# Patient Record
Sex: FEMALE | Race: WHITE | NOT HISPANIC OR LATINO | Employment: OTHER | URBAN - METROPOLITAN AREA
[De-identification: names, ages, dates, MRNs, and addresses within clinical notes are randomized per-mention and may not be internally consistent; named-entity substitution may affect disease eponyms.]

---

## 2017-09-05 ENCOUNTER — GENERIC CONVERSION - ENCOUNTER (OUTPATIENT)
Dept: OTHER | Facility: OTHER | Age: 82
End: 2017-09-05

## 2017-09-05 DIAGNOSIS — Z00.00 ENCOUNTER FOR GENERAL ADULT MEDICAL EXAMINATION WITHOUT ABNORMAL FINDINGS: ICD-10-CM

## 2017-09-05 DIAGNOSIS — E78.5 HYPERLIPIDEMIA: ICD-10-CM

## 2017-09-05 DIAGNOSIS — K21.9 GASTRO-ESOPHAGEAL REFLUX DISEASE WITHOUT ESOPHAGITIS: ICD-10-CM

## 2017-09-05 DIAGNOSIS — I25.10 ATHEROSCLEROTIC HEART DISEASE OF NATIVE CORONARY ARTERY WITHOUT ANGINA PECTORIS: ICD-10-CM

## 2017-09-05 DIAGNOSIS — E53.8 DEFICIENCY OF OTHER SPECIFIED B GROUP VITAMINS: ICD-10-CM

## 2017-09-05 DIAGNOSIS — M48.061 SPINAL STENOSIS OF LUMBAR REGION: ICD-10-CM

## 2017-09-05 DIAGNOSIS — I10 ESSENTIAL (PRIMARY) HYPERTENSION: ICD-10-CM

## 2017-09-05 DIAGNOSIS — R73.01 IMPAIRED FASTING GLUCOSE: ICD-10-CM

## 2017-10-11 ENCOUNTER — ALLSCRIPTS OFFICE VISIT (OUTPATIENT)
Dept: OTHER | Facility: OTHER | Age: 82
End: 2017-10-11

## 2017-10-13 NOTE — PROGRESS NOTES
Assessment  1  Benign essential hypertension (401 1) (I10)   2  CAD (coronary atherosclerotic disease) (414 00) (I25 10)   3  Esophageal reflux (530 81) (K21 9)   4  Fatigue (780 79) (R53 83)   5  Gait disturbance (781 2) (R26 9)   6  Hyperlipidemia (272 4) (E78 5)   7  Vitamin B12 deficiency (266 2) (E53 8)   8  Weight loss (783 21) (R63 4)   9  Primary degenerative dementia of Alzheimer type (331 0,294 10) (G30 9,F02 80)   10  Urinary incontinence, unspecified type (788 30) (R32)   11  Lumbar canal stenosis (724 02) (M48 061)   12  Depression (311) (F32 9)    Plan  Need for influenza vaccination    · Fluzone High-Dose 0 5 ML Intramuscular Suspension Prefilled Syringe    Discussion/Summary    Long discussion with patient's daughter  will try to limit testing  repeat labs I did suggest stopping generic Exelon to see if her appetite would improve and diarrhea issues resolve  could try nutritional support lactose free supplement  high-dose flu vaccine today  Follow up once results are back  Advised to call if any changes  History of Present Illness  HPI: followup visit  here with her daughter and private aide  last seen 2016 medications reviewed  history of dementia with prior neurology evaluation  currently on generic Exelon 3 mg BID  her   2015  she lives with her daughter  ARCHANA coming in 12 hours a day during the week  Daughter reports an overall decline after a stay in respite care 2017  Decreased appetite frequent diarrhea after meals despite changes with her diet such as eliminating milk and dairy products  More fatigue sleeping frequently during the day  patient is able to feed and toilet herself  transfers unassisted  she needs helps with bathing and dressing  urinary incontinence  no changes  she ambulates short distances with a rollator walker  no recent falls  last labs 2016 see note  hypertension blood pressures stable on current regimen   creatinine 0 71  electrolytes normal  impaired fasting glucose   A1c 6 0  labs 06/2015 A1c 6 2  vitamin D low at 24  vitamin B 12 normal  Folic acid 8 1  Lipid profile cholesterol 150,TGs 155  HDL 52  LDL 77  she is no longer taking statin  Review of Systems    Constitutional: recent ? 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    Cardiovascular: lower extremity edema-and-chronic venous insufficiency  wears compression stockings  history of CAD, but-no chest pain-and-no palpitations  Respiratory: shortness of breath during exertion-and-exertional dyspnea no changes, but-no cough,-no orthopnea,-no wheezing-and-no PND  Gastrointestinal: history of GERD  stable on Protonix  no dysphagia  , but-no abdominal pain,-no nausea,-no constipation-and-no diarrhea  Genitourinary: incontinence-and-UI no changes  she has been on a number of different medications without improvement  she was offered further evaluation in the past but declined  Musculoskeletal: arthralgias-and-chronic lower back pain  no radicular pain  history of lumbar spinal stenosis  she has been prn Tylenol , but-no myalgias  Neurological: chronic disequilibrium  ambulates with walker  CT head 04/2014 no acute changes  moderate microangiopathic changes  , but-no headache  Psychiatric: depression-and-stable on Lexapro 10 mg daily, but-no anxiety  Endocrine: vitamin D deficiency on vitamin D supplement  02/2016 vitamin D level 31 , but-no muscle weakness  Hematologic/Lymphatic: previously documented B 12 deficiency  Active Problems  1  Benign essential hypertension (401 1) (I10)   2  CAD (coronary atherosclerotic disease) (414 00) (I25 10)   3  Depression (311) (F32 9)   4  Esophageal reflux (530 81) (K21 9)   5  Hyperlipidemia (272 4) (E78 5)   6  Insomnia (780 52) (G47 00)   7  Lumbar canal stenosis (724 02) (M48 061)   8  Primary degenerative dementia of Alzheimer type (331 0,294 10) (G30 9,F02 80)   9  Vitamin B12 deficiency (266 2) (E53 8)    Past Medical History  1  History of Accelerated essential hypertension (401 0) (I10)   2  History of rheumatic fever (V12 09) (Z86 79)   3  History of Impaired fasting glucose (790 21) (R73 01)    Family History  Mother    1  Family history of heart attack (V17 3) (Z82 49)  Child    2  Family history of malignant neoplasm (V16 9) (Z80 9)  Family History Reviewed: The family history was reviewed and updated today  Social History   · Completed 12th grade   ·    · Never A Smoker   · No alcohol use   · No caffeine use   · No drug use   · Some college  The social history was reviewed and is unchanged  Surgical History  1  History of Cholecystectomy   2  History of Hysterectomy   3  History of Small Bowel Resection   4  History of Tonsillectomy   5  History of Treatment Of Ankle Fracture    Current Meds   1  Amlodipine Besy-Benazepril HCl - 5-20 MG Oral Capsule; take 1 capsule daily; Therapy: 05AOG4457 to (Evaluate:86Pml3394)  Requested for: 26Jun2017; Last   Rx:26Jun2017 Ordered   2  Aspirin 81 MG TABS; TAKE 1 TABLET DAILY; Therapy: 23OLD0641 to (Evaluate:02Jun2014); Last Rx:73Uzh5649 Ordered   3  Atenolol 50 MG Oral Tablet; Take 1 tablet daily; Therapy: 67AHD8587 to (Evaluate:64Ywp7479)  Requested for: 19SZJ5069; Last   NX:70QIY7081 Ordered   4  Escitalopram Oxalate 10 MG Oral Tablet; take 1 tablet by mouth daily; Therapy: 56Dyf9210 to (Evaluate:08Awn4121)  Requested for: 92DMW2688; Last   CP:01BXS8165 Ordered   5  HydroCHLOROthiazide 12 5 MG Oral Capsule; TAKE 1 CAPSULE DAILY EVERY   MORNING; Therapy: 51XXR6303 to (MSOEQJWE:86VBH7842)  Requested for: 69HYA0438; Last   Rx:08Hli9933 Ordered   6  Isosorbide Mononitrate ER 30 MG Oral Tablet Extended Release 24 Hour; TAKE 1   TABLET DAILY -MAY CAUSE DIZZINESS -MAY CAUSE HEADACHE; Therapy: 60OVK8212 to (Lauren Hayes)  Requested for: 07BMW3777; Last   Rx:32Tva7941 Ordered   7   Pantoprazole Sodium 40 MG Oral Tablet Delayed Release; Take 1 tablet daily; Therapy: 83FGW0362 to (Evaluate:90Sgr6066)  Requested for: 26Jun2017; Last   Rx:26Jun2017 Ordered    Allergies  1  Penicillins   2  Sulfa Drugs    Vitals   Recorded: 34NLI5118 01:40PM   Temperature 98 F   Heart Rate 56   Respiration 18   Systolic 397   Diastolic 70   Height 5 ft 2 in   Weight Unobtainable Yes     Physical Exam    Constitutional   General appearance: No acute distress, well appearing and well nourished  Eyes   Conjunctiva and lids: No swelling, erythema or discharge  -sclera anicteric  Pupils and irises: Equal, round, reactive to light  Ears, Nose, Mouth, and Throat   Otoscopic examination: Tympanic membranes translucent with normal light reflex  Canals patent without erythema  Oropharynx: Normal with no erythema, edema, exudate or lesions  Neck   Neck: Supple, symmetric, trachea midline, no masses  Thyroid: Normal, no thyromegaly  There were no thyroid nodules  Pulmonary   Auscultation of lungs: Clear to auscultation  Cardiovascular   Auscultation of heart: Abnormal   The heart rate was normal  The rhythm was regular  Heart sounds: no gallop heard  A grade 1 systolic murmur was heard at the RUSB  Carotid pulses: 2+ bilaterally  no bruit heard over the right carotid-and-no bruit heard over the left carotid  Examination of extremities for edema and/or varicosities: Abnormal   bilateral ankle trace+ pitting edema  Abdomen   Abdomen: Non-tender, no masses  Liver and spleen: No hepatomegaly or splenomegaly  Lymphatic   Palpation of lymph nodes in neck: No lymphadenopathy  no anterior cervical node enlargement,-no posterior cervical node enlargement,-no submandibular node enlargement-and-no supraclavicular node enlargement  Musculoskeletal gait not tested  Muscle strength/tone: Normal   Motor Tone: no muscle rigidity-and-no cogwheel rigidity of the arms   Tremor(s): no pill rolling tremor on the right hand-and-no pill rolling tremor on the left hand  Skin   Skin and subcutaneous tissue: Normal without rashes or lesions  Neurologic   Cranial nerves: Cranial nerves II-XII intact  Psychiatric   Recent and remote memory: Abnormal   Memory: impaired short term memory-and-impaired long term memory  Mood and affect: Normal        Results/Data  VAS LOWER LIMB VENOUS DUPLEX STUDY, UNILATERAL/LIMITED 50HWV7642 10:39AM Laura Raytahira Order Number: [id number]     Test Name Result Flag Reference   VAS LOWER LIMB VENOUS DUPLEX STUDY, UNILATERAL/LIMITED (Report)     [facility] REPORT   CLINICAL:   Indications: Left Limb Pain [M79 609]  Lt ankle pain and swelling  CONCLUSION:   Impression:   RIGHT LOWER LIMB LIMITED: NORMAL   Evaluation shows no evidence of thrombus in the common femoral vein  Doppler evaluation shows a normal response to augmentation maneuvers  LEFT LOWER LIMB: ABNORMAL   No evidence of acute or chronic deep vein thrombosis   No evidence of superficial thrombophlebitis noted  Doppler evaluation shows a normal response to augmentation maneuvers  Popliteal, posterior tibial and anterior tibial arterial Doppler waveforms are   triphasic  There is a well defined complex non-vascularized structure noted on the medial   to lateral ankle consistent with a hematoma  SIGNATURE:   Electronically Signed by: Boris Scott MD, [address] on 2016-03-17 09:39:32 PM     * XR TIBIA FIBULA 2 VIEW LEFT 40WAN5364 12:38PM Laura Maylin Order Number: [id number]     Test Name Result Flag Reference   XR TIBIA FIBULA 2 VW LEFT (Report)     LEFT TIBIA AND FIBULA     INDICATION: Soft tissue mass  COMPARISON: None     VIEWS: AP and lateral; 4 images     FINDINGS:     There is no acute fracture or dislocation  No degenerative changes  No lytic or blastic lesions are seen       Soft tissue swelling overlying the distal tibia noted with mild lateral malleolar soft tissue swelling also identified  IMPRESSION:     No acute osseous abnormality  Soft tissue swelling  Workstation performed: [id number]     Signed by:   Junior Reed MD   3/14/16     (1) COMPREHENSIVE METABOLIC PANEL 96URH6654 10:34KR [employer]     Test Name Result Flag Reference   GLUCOSE 116 mg/dL H 65-99   Fasting reference interval   UREA NITROGEN (BUN) 18 mg/dL  7-25   CREATININE 0 71 mg/dL  0 60-0 88   For patients >52years of age, the reference limit  for Creatinine is approximately 13% higher for people  identified as -American  eGFR NON-AFR   AMERICAN 76 mL/min/1 73m2  > OR = 60   eGFR AFRICAN AMERICAN 88 mL/min/1 73m2  > OR = 60   BUN/CREATININE RATIO   3-51   NOT APPLICABLE (calc)   SODIUM 138 mmol/L  135-146   POTASSIUM 4 4 mmol/L  3 5-5 3   CHLORIDE 100 mmol/L     CARBON DIOXIDE 27 mmol/L  19-30   CALCIUM 9 6 mg/dL  8 6-10 4   PROTEIN, TOTAL 6 4 g/dL  6 1-8 1   ALBUMIN 4 1 g/dL  3 6-5 1   GLOBULIN 2 3 g/dL (calc)  1 9-3 7   ALBUMIN/GLOBULIN RATIO 1 8 (calc)  1 0-2 5   BILIRUBIN, TOTAL 0 4 mg/dL  0 2-1 2   ALKALINE PHOSPHATASE 75 U/L     AST 16 U/L  10-35   ALT 16 U/L  6-29     (1) CBC/PLT/DIFF [id number] 10:02AM [employer]     Test Name Result Flag Reference   WHITE BLOOD CELL COUNT 4 7 Thousand/uL  3 8-10 8   RED BLOOD CELL COUNT 3 88 Million/uL  3 80-5 10   HEMOGLOBIN 12 3 g/dL  11 7-15 5   HEMATOCRIT 37 2 %  35 0-45 0   MCV 95 7 fL  80 0-100 0   MCH 31 6 pg  27 0-33 0   MCHC 33 1 g/dL  32 0-36 0   RDW 13 7 %  11 0-15 0   PLATELET COUNT 506 Thousand/uL  140-400   ABSOLUTE NEUTROPHILS 2500 cells/uL  6544-3741   ABSOLUTE LYMPHOCYTES 1603 cells/uL  850-3900   ABSOLUTE MONOCYTES 329 cells/uL  200-950   ABSOLUTE EOSINOPHILS 249 cells/uL     ABSOLUTE BASOPHILS 19 cells/uL  0-200   NEUTROPHILS 53 2 %     LYMPHOCYTES 34 1 %     MONOCYTES 7 0 %     EOSINOPHILS 5 3 %     BASOPHILS 0 4 %     MPV 7 4 fL L 7 5-11 5     (Q) TSH, 3RD GENERATION [id number] 10:02AM Mary Carlton     Test Name Result Flag Reference   TSH 1 91 mIU/L  0 40-4 50     (1) VITAMIN B12 79FCN7268 10:02AM Mary Carlton     Test Name Result Flag Reference   VITAMIN B12 >2000 pg/mL H 200-1100     *(Q) VITAMIN D, 25-HYDROXY, LC/MS/MS [id number] 10:02AM Mary Carlton     Test Name Result Flag Reference   VITAMIN D, 25-OH, TOTAL 31 ng/mL     Vitamin D Status         25-OH Vitamin D:     Deficiency:                    <20 ng/mL  Insufficiency:             20 - 29 ng/mL  Optimal:                 > or = 30 ng/mL     For 25-OH Vitamin D testing on patients on   D2-supplementation and patients for whom quantitation   of D2 and D3 fractions is required, the QuestAssureD(TM)  25-OH VIT D, (D2,D3), LC/MS/MS is recommended: order   code 76999 (patients >2yrs)  For more information on this test, go to:  http://[email or web address]/HKB042  (This link is being provided for   informational/educational purposes only )     (Q) HEMOGLOBIN A1c [id number] 10:02AM Mary Carlton   REPORT COMMENT:  FASTING:YES     Test Name Result Flag Reference   HEMOGLOBIN A1c 6 0 % of total Hgb H <5 7   According to ADA guidelines, hemoglobin A1c <7 0%  represents optimal control in non-pregnant diabetic  patients  Different metrics may apply to specific  patient populations  Standards of Medical Care in    Diabetes Care  2013;36:s11-s66     For the purpose of screening for the presence of  diabetes  <5 7%       Consistent with the absence of diabetes  5 7-6 4%    Consistent with increased risk for diabetes              (prediabetes)  >or=6 5%    Consistent with diabetes     This assay result is consistent with an increased risk  of diabetes  Currently, no consensus exists for use of hemoglobin  A1c for diagnosis of diabetes for children       * XR Lumbosacral Spine 2 or 3 Views (injury) [id number] 01:44PM Radhakerencarl Carlton     Test Name Result Flag Reference   XR LSpine <4 View (Report)     [facility] - [address] [facility];;[place];[id number]   11/20/2015 1350   11/20/2015 1355   3 IMAGES     LUMBAR SPINE     INDICATION- History of spinal stenosis  Patient slipped going into   bed one week ago  Back pain     COMPARISON- None     VIEWS- AP, lateral and coned-down projections& 3 images3 images     FINDINGS-     Alignment is unremarkable  There is no radiographic evidence of acute fracture or destructive   osseous lesion  There is advanced chronic diffuse disc degeneration throughout the   lumbar spine from L1 to S1  Disc narrowing with vacuum disc phenomenon   and at least some vertebral endplate sclerosis and bone spur formation   is noted at every level  The frontal image demonstrates a mild   dextroscoliosis of the lumbar spine with apex at L3  The curvature is   9 degrees  There are surgical clips in the right abdomen  Atherosclerosis of   aorta is noted  IMPRESSION-     Relatively severe diffuse disc degeneration throughout the entire   lumbar spine  9 degrees dextroscoliosis with apex at L3  Transcribed on- [id number]     - MARIVEL VIERA MD   Reading Radiologist- MARIVEL Bermudez MD   Electronically Signed- MARIVEL Bermudez MD   Released Date Time- 11/20/15 1659   ------------------------------------------------------------------------------   [id number] TREND   [id number] TREND     * Sacrum & Coccyx Xray 2 Views [id number] 01:44PM Shirley Lora     Test Name Result Flag Reference   XR Sacrum/Coccyx 2 V (Report)     [place] [address] [facility];;[place];[address]   11/20/2015 1348   11/20/2015 1354   3 IMAGES     SACRUM AND COCCYX     INDICATION- Patient slipped going into bed a week ago  Back pain  Spinal stenosis     COMPARISON- None     VIEWS- 3& 3 images3 images     FINDINGS-     There is no acute evidence of fracture  Sacral arcuate lines are maintained  The SI joints appear symmetric       Pubic symphysis maintained  Degenerative changes lumbar discs  IMPRESSION-     No fracture  Transcribed on- [id number]     3501 MARIVEL Desouza MD   Reading Radiologist- MARIVEL VIERA MD   Electronically Signed- MARIVEL Fang MD   Released Date Time- 11/20/15 1700   ------------------------------------------------------------------------------   [id number] TREND   [id number] TREND     * CT Head Without Contrast [id number] 10:30AM Nurys Arrieta     Test Name Result Flag Reference   CT Head W/O (Report)     [address];;[place];[address]  04/29/2014 1049  04/29/2014 1106      CT BRAIN - WITHOUT CONTRAST    INDICATION- Memory loss  Balance problems  COMPARISON- None    TECHNIQUE- Multiple contiguous axial CT images were obtained at 2 5 mm  intervals through the posterior fossa followed by 5 mm intervals  through the remainder of the brain  Examination was performed  utilizing techniques to minimize radiation dose, including the use of  dose reduction software  IMAGE QUALITY- Diagnostic    FINDINGS-    PARENCHYMA- Decreased attenuation is noted in the supratentorial white  matter demonstrating an appearance most consistent with moderate  microangiopathic change  No intracranial mass, mass effect or midline shift  No acute  intracranial hemorrhage  No CT signs of acute infarction  VENTRICLES AND EXTRA-AXIAL SPACES-  Age-appropriate volume loss is  noted  No hydrocephalus  VISUALIZED ORBITS AND PARANASAL SINUSES- Unremarkable  CALVARIUM AND EXTRACRANIAL SOFT TISSUES- Normal     IMPRESSION-    No acute intracranial abnormality  Microangiopathic changes            Transcribed on- Naomia Meigs, RAD MD  Reading Radiologist- MARIVEL Ramos MD  Releasing Radiologist- MARIVEL Ramos MD  Released Date Time- 04/29/14 51 [phone]  ------------------------------------------------------------------------------  [id number] Christiano Gil  [id number] Christiano Gil     Future Appointments    Date/Time Provider Specialty Site   12/29/2017 11:00 AM JANICE Manzano   Family Medicine [address]     Signatures   Electronically signed by : JANICE Avila ; Oct 11 2017  5:59PM EST                       (Author)

## 2017-10-17 ENCOUNTER — LAB CONVERSION - ENCOUNTER (OUTPATIENT)
Dept: OTHER | Facility: OTHER | Age: 82
End: 2017-10-17

## 2017-10-17 ENCOUNTER — GENERIC CONVERSION - ENCOUNTER (OUTPATIENT)
Dept: OTHER | Facility: OTHER | Age: 82
End: 2017-10-17

## 2017-10-17 DIAGNOSIS — D64.9 ANEMIA: ICD-10-CM

## 2017-10-17 DIAGNOSIS — E53.8 DEFICIENCY OF OTHER SPECIFIED B GROUP VITAMINS (CODE): ICD-10-CM

## 2017-10-17 LAB
A/G RATIO (HISTORICAL): 1.4 (CALC) (ref 1–2.5)
ALBUMIN SERPL BCP-MCNC: 3.4 G/DL (ref 3.6–5.1)
ALP SERPL-CCNC: 79 U/L (ref 33–130)
ALT SERPL W P-5'-P-CCNC: 7 U/L (ref 6–29)
AST SERPL W P-5'-P-CCNC: 10 U/L (ref 10–35)
BILIRUB SERPL-MCNC: 0.3 MG/DL (ref 0.2–1.2)
BUN SERPL-MCNC: 23 MG/DL (ref 7–25)
BUN/CREA RATIO (HISTORICAL): 23 (CALC) (ref 6–22)
CALCIUM SERPL-MCNC: 9.3 MG/DL (ref 8.6–10.4)
CHLORIDE SERPL-SCNC: 100 MMOL/L (ref 98–110)
CO2 SERPL-SCNC: 30 MMOL/L (ref 20–31)
CREAT SERPL-MCNC: 1.01 MG/DL (ref 0.6–0.88)
DEPRECATED RDW RBC AUTO: 12.6 % (ref 11–15)
EGFR AFRICAN AMERICAN (HISTORICAL): 57 ML/MIN/1.73M2
EGFR-AMERICAN CALC (HISTORICAL): 49 ML/MIN/1.73M2
GAMMA GLOBULIN (HISTORICAL): 2.5 G/DL (CALC) (ref 1.9–3.7)
GLUCOSE (HISTORICAL): 156 MG/DL (ref 65–99)
HBA1C MFR BLD HPLC: 6 % OF TOTAL HGB
HCT VFR BLD AUTO: 33.4 % (ref 35–45)
HGB BLD-MCNC: 11.4 G/DL (ref 11.7–15.5)
MCH RBC QN AUTO: 31.7 PG (ref 27–33)
MCHC RBC AUTO-ENTMCNC: 34.1 G/DL (ref 32–36)
MCV RBC AUTO: 92.8 FL (ref 80–100)
PLATELET # BLD AUTO: 301 THOUSAND/UL (ref 140–400)
PMV BLD AUTO: 9.5 FL (ref 7.5–12.5)
POTASSIUM SERPL-SCNC: 4.1 MMOL/L (ref 3.5–5.3)
RBC # BLD AUTO: 3.6 MILLION/UL (ref 3.8–5.1)
SODIUM SERPL-SCNC: 137 MMOL/L (ref 135–146)
TOTAL PROTEIN (HISTORICAL): 5.9 G/DL (ref 6.1–8.1)
TSH SERPL DL<=0.05 MIU/L-ACNC: 1.35 MIU/L (ref 0.4–4.5)
WBC # BLD AUTO: 5.1 THOUSAND/UL (ref 3.8–10.8)

## 2017-10-19 LAB
FOLATE SERPL-MCNC: 2.8 NG/ML
VIT B12 SERPL-MCNC: 633 PG/ML (ref 200–1100)

## 2017-10-20 ENCOUNTER — LAB CONVERSION - ENCOUNTER (OUTPATIENT)
Dept: OTHER | Facility: OTHER | Age: 82
End: 2017-10-20

## 2017-10-20 ENCOUNTER — GENERIC CONVERSION - ENCOUNTER (OUTPATIENT)
Dept: OTHER | Facility: OTHER | Age: 82
End: 2017-10-20

## 2017-10-20 LAB
ALBUMIN SERPL BCP-MCNC: 3.5 G/DL (ref 3.8–4.8)
ALPHA 1 (HISTORICAL): 0.3 G/DL (ref 0.2–0.3)
ALPHA 2 (HISTORICAL): 0.8 G/DL (ref 0.5–0.9)
BETA-1 (HISTORICAL): 0.4 G/DL (ref 0.4–0.6)
BETA-2 (HISTORICAL): 0.3 G/DL (ref 0.2–0.5)
GAMMA GLOBULIN (HISTORICAL): 0.7 G/DL (ref 0.8–1.7)
IRON SATN MFR SERPL: 35 % (CALC) (ref 11–50)
IRON SERPL-MCNC: 95 MCG/DL (ref 45–160)
RETIC COUNT (HISTORICAL): 1.5 %
RETICS # AUTO: NORMAL CELLS/UL (ref 20000–80000)
TIBC SERPL-MCNC: 269 MCG/DL (CALC) (ref 250–450)
TOTAL PROTEIN (HISTORICAL): 6 G/DL (ref 6.1–8.1)

## 2017-11-17 LAB
DEPRECATED RDW RBC AUTO: 12.7 % (ref 11–15)
HCT VFR BLD AUTO: 33.5 % (ref 35–45)
HGB BLD-MCNC: 11.1 G/DL (ref 11.7–15.5)
MCH RBC QN AUTO: 31.5 PG (ref 27–33)
MCHC RBC AUTO-ENTMCNC: 33.1 G/DL (ref 32–36)
MCV RBC AUTO: 95.2 FL (ref 80–100)
PLATELET # BLD AUTO: 248 THOUSAND/UL (ref 140–400)
PMV BLD AUTO: 9.7 FL (ref 7.5–12.5)
RBC # BLD AUTO: 3.52 MILLION/UL (ref 3.8–5.1)
WBC # BLD AUTO: 4.8 THOUSAND/UL (ref 3.8–10.8)

## 2017-11-19 ENCOUNTER — GENERIC CONVERSION - ENCOUNTER (OUTPATIENT)
Dept: OTHER | Facility: OTHER | Age: 82
End: 2017-11-19

## 2018-01-03 ENCOUNTER — GENERIC CONVERSION - ENCOUNTER (OUTPATIENT)
Dept: OTHER | Facility: OTHER | Age: 83
End: 2018-01-03

## 2018-01-14 VITALS
TEMPERATURE: 98 F | SYSTOLIC BLOOD PRESSURE: 118 MMHG | RESPIRATION RATE: 18 BRPM | DIASTOLIC BLOOD PRESSURE: 70 MMHG | HEIGHT: 62 IN | HEART RATE: 56 BPM

## 2018-01-14 NOTE — RESULT NOTES
Message  Kylah I have enclosed a copy of Tracy's recent repeat CBC  hemoglobin or red cell count 11 1 normal 11 7 to 15 5  This decreased slightly from 11 4  I would continue to monitor this  I will review her labs with you at her next appointment in December  Please call if you have any questions        Verified Results  (Q) CBC (H/H, RBC, INDICES, WBC, PLT) [id number] 11:18AM Donnell Melendez   REPORT COMMENT:  FASTING:NO     Test Name Result Flag Reference   WHITE BLOOD CELL COUNT 4 8 Thousand/uL  3 8-10 8   RED BLOOD CELL COUNT 3 52 Million/uL L 3 80-5 10   HEMOGLOBIN 11 1 g/dL L 11 7-15 5   HEMATOCRIT 33 5 % L 35 0-45 0   MCV 95 2 fL  80 0-100 0   MCH 31 5 pg  27 0-33 0   MCHC 33 1 g/dL  32 0-36 0   RDW 12 7 %  11 0-15 0   PLATELET COUNT 180 Thousand/uL  140-400   MPV 9 7 fL  7 5-12 5       Signatures   Electronically signed by : JANICE Causey ; Nov 19 2017 11:19AM EST                       (Author)

## 2018-01-23 NOTE — MISCELLANEOUS
Message  I spoke with patient's daughter this evening Kylah  recent agitation with multiple attempts to try to redirect patient unsuccessful  she is with an aide during the day  current medications reviewed  no recent illnesses  labs 11/2017 reviewed plan  script for low dose prn Ativan  advised to call if any changes  Plan  Primary degenerative dementia of Alzheimer type    · LORazepam 0 5 MG Oral Tablet; One tablet daily as needed    Results/Data     (1) COMPREHENSIVE METABOLIC PANEL   GLUCOSE: 156 mg/dL Abnormal High Reference Range 65-99  UREA NITROGEN (BUN): 23 mg/dL Reference Range 7-25  CREATININE: 1 01 mg/dL Abnormal High Reference Range 0 60-0 88  eGFR NON-AFR   AMERICAN: 49 mL/min/1 73m2 Abnormal Low Reference Range >  OR = 60  eGFR : 57 mL/min/1 73m2 Abnormal Low Reference Range > OR  = 60  BUN/CREATININE RATIO: 23 (calc) Abnormal High Reference Range 6-22  SODIUM: 137 mmol/L Reference Range 135-146  POTASSIUM: 4 1 mmol/L Reference Range 3 5-5 3  CHLORIDE: 100 mmol/L Reference Range   CARBON DIOXIDE: 30 mmol/L Reference Range 20-31  CALCIUM: 9 3 mg/dL Reference Range 8 6-10 4  PROTEIN, TOTAL: 5 9 g/dL Abnormal Low Reference Range 6 1-8 1  ALBUMIN: 3 4 g/dL Abnormal Low Reference Range 3 6-5 1  GLOBULIN: 2 5 g/dL (calc) Reference Range 1 9-3 7  ALBUMIN/GLOBULIN RATIO: 1 4 (calc) Reference Range 1 0-2 5  BILIRUBIN, TOTAL: 0 3 mg/dL Reference Range 0 2-1 2  ALKALINE PHOSPHATASE: 79 U/L Reference Range   AST: 10 U/L Reference Range 10-35  ALT: 7 U/L Reference Range 6-29  (Q) CBC (H/H, RBC, INDICES, WBC, PLT)   WHITE BLOOD CELL COUNT: 5 1 Thousand/uL Reference Range 3 8-10 8  RED BLOOD CELL COUNT: 3 60 Million/uL Abnormal Low Reference Range 3 80-5 10  HEMOGLOBIN: 11 4 g/dL Abnormal Low Reference Range 11 7-15 5  HEMATOCRIT: 33 4 % Abnormal Low Reference Range 35 0-45 0  MCV: 92 8 fL Reference Range 80 0-100 0  MCH: 31 7 pg Reference Range 27 0-33 0  MCHC: 34 1 g/dL Reference Range 32 0-36 0  RDW: 12 6 % Reference Range 11 0-15 0  PLATELET COUNT: 573 Thousand/uL Reference Range 140-400  MPV: 9 5 fL Reference Range 7 5-12 5  (Q) TSH, 3RD GENERATION   TSH: 1 35 mIU/L Reference Range 0 40-4 50  (Q) HEMOGLOBIN A1c   HEMOGLOBIN A1c: 6 0 % of total Hgb Abnormal High Reference Range <5 7   (1) FOLATE   FOLATE, SERUM: 2 8 ng/mL Abnormal Low  (1) VITAMIN B12   VITAMIN B12: 633 pg/mL Reference Range 200-1100  (Q) PROTEIN, TOTAL AND PROTEIN ELECTROPHORESIS   PROTEIN, TOTAL: 6 0 g/dL Abnormal Low Reference Range 6 1-8 1  ALBUMIN: 3 5 g/dL Abnormal Low Reference Range 3 8-4 8  ALPHA-1-GLOBULINS: 0 3 g/dL Reference Range 0 2-0 3  ALPHA-2-GLOBULINS: 0 8 g/dL Reference Range 0 5-0 9  BETA 1 GLOBULIN: 0 4 g/dL Reference Range 0 4-0 6  BETA 2 GLOBULIN: 0 3 g/dL Reference Range 0 2-0 5  GAMMA GLOBULINS: 0 7 g/dL Abnormal Low Reference Range 0 8-1 7  INTERPRETATION:    (1) IRON SATURATION %, TIBC   IRON, TOTAL: 95 mcg/dL Reference Range   IRON BINDING CAPACITY: 269 mcg/dL (calc) Reference Range 250-450  % SATURATION: 35 % (calc) Reference Range 11-50  (1) RETICULOCYTE COUNT   RETICULOCYTE COUNT,$AUTOMATED: 1 5 %  RETICULOCYTE, ABSOLUTE: 92318 cells/uL Reference Range 36472-55689   (Q) CBC (H/H, RBC, INDICES, WBC, PLT)   WHITE BLOOD CELL COUNT: 4 8 Thousand/uL Reference Range 3 8-10 8  RED BLOOD CELL COUNT: 3 52 Million/uL Abnormal Low Reference Range 3 80-5 10  HEMOGLOBIN: 11 1 g/dL Abnormal Low Reference Range 11 7-15 5  HEMATOCRIT: 33 5 % Abnormal Low Reference Range 35 0-45 0  MCV: 95 2 fL Reference Range 80 0-100 0  MCH: 31 5 pg Reference Range 27 0-33 0  MCHC: 33 1 g/dL Reference Range 32 0-36 0  RDW: 12 7 % Reference Range 11 0-15 0  PLATELET COUNT: 153 Thousand/uL Reference Range 140-400  MPV: 9 7 fL Reference Range 7 5-12 5    Signatures   Electronically signed by : JANICE Causey ; [place]  3 2018  5:09PM EST                       (Author)

## 2018-02-27 DIAGNOSIS — I10 ESSENTIAL HYPERTENSION: Primary | ICD-10-CM

## 2018-02-27 RX ORDER — ATENOLOL 50 MG/1
TABLET ORAL
Qty: 30 TABLET | Refills: 5 | Status: SHIPPED | OUTPATIENT
Start: 2018-02-27

## 2018-03-14 DIAGNOSIS — F03.90 DEMENTIA WITHOUT BEHAVIORAL DISTURBANCE, UNSPECIFIED DEMENTIA TYPE (HCC): Primary | ICD-10-CM

## 2018-03-14 RX ORDER — RIVASTIGMINE TARTRATE 3 MG/1
CAPSULE ORAL
Qty: 60 CAPSULE | Refills: 5 | Status: SHIPPED | OUTPATIENT
Start: 2018-03-14

## 2018-03-26 DIAGNOSIS — I10 ESSENTIAL HYPERTENSION: Primary | ICD-10-CM

## 2018-03-26 RX ORDER — HYDROCHLOROTHIAZIDE 12.5 MG/1
CAPSULE, GELATIN COATED ORAL
Qty: 30 CAPSULE | Refills: 5 | Status: SHIPPED | OUTPATIENT
Start: 2018-03-26 | End: 2018-04-10 | Stop reason: HOSPADM

## 2018-04-09 ENCOUNTER — APPOINTMENT (EMERGENCY)
Dept: CT IMAGING | Facility: HOSPITAL | Age: 83
End: 2018-04-09
Payer: COMMERCIAL

## 2018-04-09 ENCOUNTER — APPOINTMENT (EMERGENCY)
Dept: RADIOLOGY | Facility: HOSPITAL | Age: 83
End: 2018-04-09
Payer: COMMERCIAL

## 2018-04-09 ENCOUNTER — HOSPITAL ENCOUNTER (OUTPATIENT)
Facility: HOSPITAL | Age: 83
Setting detail: OBSERVATION
LOS: 1 days | Discharge: NURSING, REHAB OR LONG-TERM CARE | End: 2018-04-10
Attending: EMERGENCY MEDICINE | Admitting: INTERNAL MEDICINE
Payer: COMMERCIAL

## 2018-04-09 DIAGNOSIS — G93.40 ENCEPHALOPATHY: Primary | ICD-10-CM

## 2018-04-09 PROBLEM — I10 ACCELERATED HYPERTENSION: Status: ACTIVE | Noted: 2018-04-09

## 2018-04-09 PROBLEM — F02.81 ALZHEIMER'S DEMENTIA WITH BEHAVIORAL DISTURBANCE (HCC): Status: ACTIVE | Noted: 2018-04-09

## 2018-04-09 PROBLEM — G30.9 ALZHEIMER'S DEMENTIA WITH BEHAVIORAL DISTURBANCE (HCC): Status: ACTIVE | Noted: 2018-04-09

## 2018-04-09 LAB
ALBUMIN SERPL BCP-MCNC: 3.5 G/DL (ref 3.5–5)
ALP SERPL-CCNC: 70 U/L (ref 46–116)
ALT SERPL W P-5'-P-CCNC: 28 U/L (ref 12–78)
AMMONIA PLAS-SCNC: <10 UMOL/L (ref 11–35)
ANION GAP SERPL CALCULATED.3IONS-SCNC: 7 MMOL/L (ref 4–13)
AST SERPL W P-5'-P-CCNC: 22 U/L (ref 5–45)
BACTERIA UR QL AUTO: ABNORMAL /HPF
BASOPHILS # BLD AUTO: 0.01 THOUSANDS/ΜL (ref 0–0.1)
BASOPHILS NFR BLD AUTO: 0 % (ref 0–1)
BILIRUB SERPL-MCNC: 0.4 MG/DL (ref 0.2–1)
BILIRUB UR QL STRIP: NEGATIVE
BUN SERPL-MCNC: 20 MG/DL (ref 5–25)
CALCIUM SERPL-MCNC: 9.9 MG/DL (ref 8.3–10.1)
CHLORIDE SERPL-SCNC: 102 MMOL/L (ref 100–108)
CLARITY UR: CLEAR
CO2 SERPL-SCNC: 29 MMOL/L (ref 21–32)
COLOR UR: YELLOW
CREAT SERPL-MCNC: 0.94 MG/DL (ref 0.6–1.3)
EOSINOPHIL # BLD AUTO: 0.08 THOUSAND/ΜL (ref 0–0.61)
EOSINOPHIL NFR BLD AUTO: 2 % (ref 0–6)
ERYTHROCYTE [DISTWIDTH] IN BLOOD BY AUTOMATED COUNT: 12.4 % (ref 11.6–15.1)
FOLATE SERPL-MCNC: >20 NG/ML (ref 3.1–17.5)
GFR SERPL CREATININE-BSD FRML MDRD: 54 ML/MIN/1.73SQ M
GLUCOSE SERPL-MCNC: 121 MG/DL (ref 65–140)
GLUCOSE UR STRIP-MCNC: NEGATIVE MG/DL
HCT VFR BLD AUTO: 36 % (ref 34.8–46.1)
HGB BLD-MCNC: 12 G/DL (ref 11.5–15.4)
HGB UR QL STRIP.AUTO: ABNORMAL
KETONES UR STRIP-MCNC: NEGATIVE MG/DL
LEUKOCYTE ESTERASE UR QL STRIP: NEGATIVE
LYMPHOCYTES # BLD AUTO: 1.18 THOUSANDS/ΜL (ref 0.6–4.47)
LYMPHOCYTES NFR BLD AUTO: 32 % (ref 14–44)
MCH RBC QN AUTO: 32.3 PG (ref 26.8–34.3)
MCHC RBC AUTO-ENTMCNC: 33.3 G/DL (ref 31.4–37.4)
MCV RBC AUTO: 97 FL (ref 82–98)
MONOCYTES # BLD AUTO: 0.33 THOUSAND/ΜL (ref 0.17–1.22)
MONOCYTES NFR BLD AUTO: 9 % (ref 4–12)
NEUTROPHILS # BLD AUTO: 2.11 THOUSANDS/ΜL (ref 1.85–7.62)
NEUTS SEG NFR BLD AUTO: 57 % (ref 43–75)
NITRITE UR QL STRIP: NEGATIVE
NON-SQ EPI CELLS URNS QL MICRO: ABNORMAL /HPF
PH UR STRIP.AUTO: 5.5 [PH] (ref 4.5–8)
PLATELET # BLD AUTO: 243 THOUSANDS/UL (ref 149–390)
PLATELET # BLD AUTO: 249 THOUSANDS/UL (ref 149–390)
PMV BLD AUTO: 8.9 FL (ref 8.9–12.7)
PMV BLD AUTO: 9.1 FL (ref 8.9–12.7)
POTASSIUM SERPL-SCNC: 4.2 MMOL/L (ref 3.5–5.3)
PROT SERPL-MCNC: 7 G/DL (ref 6.4–8.2)
PROT UR STRIP-MCNC: ABNORMAL MG/DL
RBC # BLD AUTO: 3.72 MILLION/UL (ref 3.81–5.12)
RBC #/AREA URNS AUTO: ABNORMAL /HPF
SODIUM SERPL-SCNC: 138 MMOL/L (ref 136–145)
SP GR UR STRIP.AUTO: 1.02 (ref 1–1.03)
TROPONIN I SERPL-MCNC: <0.02 NG/ML
TSH SERPL DL<=0.05 MIU/L-ACNC: 1.9 UIU/ML (ref 0.36–3.74)
UROBILINOGEN UR QL STRIP.AUTO: 0.2 E.U./DL
VIT B12 SERPL-MCNC: 461 PG/ML (ref 100–900)
WBC # BLD AUTO: 3.71 THOUSAND/UL (ref 4.31–10.16)
WBC #/AREA URNS AUTO: ABNORMAL /HPF

## 2018-04-09 PROCEDURE — 80053 COMPREHEN METABOLIC PANEL: CPT | Performed by: PHYSICIAN ASSISTANT

## 2018-04-09 PROCEDURE — 84443 ASSAY THYROID STIM HORMONE: CPT | Performed by: PHYSICIAN ASSISTANT

## 2018-04-09 PROCEDURE — 82607 VITAMIN B-12: CPT | Performed by: PHYSICIAN ASSISTANT

## 2018-04-09 PROCEDURE — 82140 ASSAY OF AMMONIA: CPT | Performed by: PHYSICIAN ASSISTANT

## 2018-04-09 PROCEDURE — 82746 ASSAY OF FOLIC ACID SERUM: CPT | Performed by: PHYSICIAN ASSISTANT

## 2018-04-09 PROCEDURE — 71046 X-RAY EXAM CHEST 2 VIEWS: CPT

## 2018-04-09 PROCEDURE — 99220 PR INITIAL OBSERVATION CARE/DAY 70 MINUTES: CPT | Performed by: INTERNAL MEDICINE

## 2018-04-09 PROCEDURE — 84484 ASSAY OF TROPONIN QUANT: CPT | Performed by: PHYSICIAN ASSISTANT

## 2018-04-09 PROCEDURE — 81001 URINALYSIS AUTO W/SCOPE: CPT

## 2018-04-09 PROCEDURE — 99285 EMERGENCY DEPT VISIT HI MDM: CPT

## 2018-04-09 PROCEDURE — 93005 ELECTROCARDIOGRAM TRACING: CPT

## 2018-04-09 PROCEDURE — 85049 AUTOMATED PLATELET COUNT: CPT | Performed by: PHYSICIAN ASSISTANT

## 2018-04-09 PROCEDURE — 36415 COLL VENOUS BLD VENIPUNCTURE: CPT | Performed by: PHYSICIAN ASSISTANT

## 2018-04-09 PROCEDURE — 70450 CT HEAD/BRAIN W/O DYE: CPT

## 2018-04-09 PROCEDURE — 85025 COMPLETE CBC W/AUTO DIFF WBC: CPT | Performed by: PHYSICIAN ASSISTANT

## 2018-04-09 RX ORDER — ESCITALOPRAM OXALATE 10 MG/1
10 TABLET ORAL DAILY
Status: DISCONTINUED | OUTPATIENT
Start: 2018-04-10 | End: 2018-04-10 | Stop reason: HOSPADM

## 2018-04-09 RX ORDER — ONDANSETRON 2 MG/ML
4 INJECTION INTRAMUSCULAR; INTRAVENOUS EVERY 4 HOURS PRN
Status: DISCONTINUED | OUTPATIENT
Start: 2018-04-09 | End: 2018-04-10 | Stop reason: HOSPADM

## 2018-04-09 RX ORDER — PANTOPRAZOLE SODIUM 40 MG/1
40 TABLET, DELAYED RELEASE ORAL
Status: DISCONTINUED | OUTPATIENT
Start: 2018-04-10 | End: 2018-04-10 | Stop reason: HOSPADM

## 2018-04-09 RX ORDER — RISPERIDONE 0.5 MG/1
0.5 TABLET, ORALLY DISINTEGRATING ORAL 2 TIMES DAILY
Status: DISCONTINUED | OUTPATIENT
Start: 2018-04-09 | End: 2018-04-10 | Stop reason: HOSPADM

## 2018-04-09 RX ORDER — FOLIC ACID 1 MG/1
TABLET ORAL DAILY
COMMUNITY

## 2018-04-09 RX ORDER — ESCITALOPRAM OXALATE 10 MG/1
10 TABLET ORAL DAILY
COMMUNITY

## 2018-04-09 RX ORDER — ASPIRIN 81 MG/1
81 TABLET ORAL DAILY
COMMUNITY

## 2018-04-09 RX ORDER — ASPIRIN 81 MG/1
81 TABLET ORAL DAILY
Status: DISCONTINUED | OUTPATIENT
Start: 2018-04-10 | End: 2018-04-10 | Stop reason: HOSPADM

## 2018-04-09 RX ORDER — ATENOLOL 50 MG/1
50 TABLET ORAL DAILY
Status: DISCONTINUED | OUTPATIENT
Start: 2018-04-10 | End: 2018-04-10 | Stop reason: HOSPADM

## 2018-04-09 RX ORDER — FOLIC ACID 1 MG/1
1 TABLET ORAL DAILY
Status: DISCONTINUED | OUTPATIENT
Start: 2018-04-10 | End: 2018-04-10 | Stop reason: HOSPADM

## 2018-04-09 RX ORDER — RIVASTIGMINE TARTRATE 1.5 MG/1
3 CAPSULE ORAL 2 TIMES DAILY WITH MEALS
Status: DISCONTINUED | OUTPATIENT
Start: 2018-04-09 | End: 2018-04-10 | Stop reason: HOSPADM

## 2018-04-09 RX ORDER — LORAZEPAM 0.5 MG/1
0.5 TABLET ORAL ONCE
Status: COMPLETED | OUTPATIENT
Start: 2018-04-09 | End: 2018-04-09

## 2018-04-09 RX ORDER — PANTOPRAZOLE SODIUM 40 MG/1
40 TABLET, DELAYED RELEASE ORAL DAILY
COMMUNITY

## 2018-04-09 RX ORDER — ISOSORBIDE MONONITRATE 30 MG/1
30 TABLET, EXTENDED RELEASE ORAL DAILY
Status: DISCONTINUED | OUTPATIENT
Start: 2018-04-10 | End: 2018-04-10 | Stop reason: HOSPADM

## 2018-04-09 RX ORDER — HYDRALAZINE HYDROCHLORIDE 20 MG/ML
5 INJECTION INTRAMUSCULAR; INTRAVENOUS EVERY 6 HOURS PRN
Status: DISCONTINUED | OUTPATIENT
Start: 2018-04-09 | End: 2018-04-10 | Stop reason: HOSPADM

## 2018-04-09 RX ORDER — SODIUM CHLORIDE 9 MG/ML
60 INJECTION, SOLUTION INTRAVENOUS ONCE
Status: COMPLETED | OUTPATIENT
Start: 2018-04-09 | End: 2018-04-09

## 2018-04-09 RX ORDER — MELATONIN
2000 DAILY
Status: DISCONTINUED | OUTPATIENT
Start: 2018-04-10 | End: 2018-04-10 | Stop reason: HOSPADM

## 2018-04-09 RX ORDER — MELATONIN
2000 DAILY
COMMUNITY

## 2018-04-09 RX ORDER — ISOSORBIDE MONONITRATE 30 MG/1
30 TABLET, EXTENDED RELEASE ORAL DAILY
COMMUNITY

## 2018-04-09 RX ORDER — AMLODIPINE BESYLATE AND BENAZEPRIL HYDROCHLORIDE 5; 20 MG/1; MG/1
1 CAPSULE ORAL DAILY
COMMUNITY

## 2018-04-09 RX ADMIN — SODIUM CHLORIDE 60 ML/HR: 0.9 INJECTION, SOLUTION INTRAVENOUS at 17:44

## 2018-04-09 RX ADMIN — RIVASTIGMINE TARTRATE 3 MG: 1.5 CAPSULE ORAL at 17:28

## 2018-04-09 RX ADMIN — ENOXAPARIN SODIUM 40 MG: 40 INJECTION SUBCUTANEOUS at 17:28

## 2018-04-09 RX ADMIN — LORAZEPAM 0.5 MG: 0.5 TABLET ORAL at 17:28

## 2018-04-09 RX ADMIN — RISPERIDONE 0.5 MG: 0.5 TABLET, ORALLY DISINTEGRATING ORAL at 17:52

## 2018-04-09 NOTE — ASSESSMENT & PLAN NOTE
·  Consult physical and occupational therapy to assess for any skillable need  ·  suspect she may have had progression of her underlying dementia which could also have been contributing to her current mental status  ·   Also involved case management-- sounds as if patient needs long-term placement at this juncture    They will be involved to assist in placement

## 2018-04-09 NOTE — ASSESSMENT & PLAN NOTE
·  Patient presented with worsening visual hallucinations, confusion and some agitation as noted per her daughter  She did not respond to low-dose oral Ativan at home  ·   Will add Risperdal 0 5 mg melt tab twice a day and observe    · Assess for acute reversible etiology such as hypertensive encephalopathy versus dehydration vs toxic metabolic-- Check O73, folate, TSH, A1c, NH3  ·  trial IV fluids  ·  initial head CT showed old cerebral vascular disease, consider repeat head CT if any concern for possible stroke-- thus far no clear evidence to suggest acute stroke

## 2018-04-09 NOTE — ASSESSMENT & PLAN NOTE
·  Recommend better blood pressure control-- continue lotrel and tenormin but hold HCTZ in case of possible mild dehydration

## 2018-04-09 NOTE — ED PROVIDER NOTES
History  Chief Complaint   Patient presents with    Altered Mental Status     Pt has a history of Alzehimers and daughter states that she was acting "bizarre" last night  She states that she was talking to people who weren't there and that she also was using different words for objects  Per daughter she states that she has had sleepless nights before but nothing "bizarre like last night"  She also stated that she doesn't think that the care taker changed her yesterday from when she was soiled  51-year-old female presents to the emergency department with altered mental status  Patient was at home with her daughter who is her primary caretaker  States she also has home health aides around the clock  Daughter states that she has been increasingly confused over the past 1-2 days  States she is talking to people who are not in the room and seems to be having trouble sleeping  No fevers at home  No history of recurrent urinary tract infections  History provided by:  Patient and relative   used: No    Altered Mental Status   Presenting symptoms: confusion    Severity:  Unable to specify  Most recent episode:  Yesterday  Episode history:  Continuous  Duration:  1 day  Timing:  Constant  Progression:  Waxing and waning  Chronicity:  New  Context: dementia    Associated symptoms: no abdominal pain, normal movement, no agitation, no bladder incontinence, no decreased appetite, no depression, no difficulty breathing, no eye deviation, no fever, no hallucinations, no headaches, no light-headedness, no nausea, no palpitations, no rash, no seizures, no slurred speech, no suicidal behavior, no visual change, no vomiting and no weakness        Prior to Admission Medications   Prescriptions Last Dose Informant Patient Reported? Taking?    amLODIPine-benazepril (LOTREL 5-20) 5-20 MG per capsule   Yes Yes   Sig: Take 1 capsule by mouth daily   aspirin (ECOTRIN LOW STRENGTH) 81 mg EC tablet Yes Yes   Sig: Take 81 mg by mouth daily   atenolol (TENORMIN) 50 mg tablet   No Yes   Sig: TAKE ONE TABLET ONCE DAILY   cholecalciferol (VITAMIN D3) 1,000 units tablet   Yes Yes   Sig: Take 2,000 Units by mouth daily   escitalopram (LEXAPRO) 10 mg tablet   Yes Yes   Sig: Take 10 mg by mouth daily   folic acid (FOLVITE) 1 mg tablet   Yes Yes   Sig: Take by mouth daily   hydrochlorothiazide (MICROZIDE) 12 5 mg capsule   No Yes   Sig: TAKE 1 CAPSULE DAILY EVERY MORNING   isosorbide mononitrate (IMDUR) 30 mg 24 hr tablet   Yes Yes   Sig: Take 30 mg by mouth daily   pantoprazole (PROTONIX) 40 mg tablet   Yes Yes   Sig: Take 40 mg by mouth daily   rivastigmine (EXELON) 3 mg capsule   No Yes   Sig: TAKE 1 CAPSULE TWICE DAILY -TAKE WITH FOOD -MAY CAUSE DROWSINESS ORDIZZINESS      Facility-Administered Medications: None       Past Medical History:   Diagnosis Date    Alzheimer disease     Hypertension     Macular degeneration     Spinal stenosis        Past Surgical History:   Procedure Laterality Date    COLON SURGERY         History reviewed  No pertinent family history  I have reviewed and agree with the history as documented  Social History   Substance Use Topics    Smoking status: Never Smoker    Smokeless tobacco: Never Used    Alcohol use No        Review of Systems   Unable to perform ROS: Mental status change   Constitutional: Negative for decreased appetite and fever  Cardiovascular: Negative for palpitations  Gastrointestinal: Negative for abdominal pain, nausea and vomiting  Genitourinary: Negative for bladder incontinence  Skin: Negative for rash  Neurological: Negative for seizures, weakness, light-headedness and headaches  Psychiatric/Behavioral: Positive for confusion  Negative for agitation and hallucinations         Physical Exam  ED Triage Vitals   Temperature Pulse Respirations Blood Pressure SpO2   04/09/18 0810 04/09/18 0806 04/09/18 0806 04/09/18 0806 04/09/18 0806   97 7 °F (36 5 °C) 61 14 (!) 180/77 100 %      Temp Source Heart Rate Source Patient Position - Orthostatic VS BP Location FiO2 (%)   04/09/18 0810 04/09/18 0806 04/09/18 0806 04/09/18 0806 --   Oral Monitor Lying Right arm       Pain Score       04/09/18 0806       No Pain           Orthostatic Vital Signs  Vitals:    04/09/18 0806   BP: (!) 180/77   Pulse: 61   Patient Position - Orthostatic VS: Lying       Physical Exam   Constitutional: She appears well-developed and well-nourished  No distress  HENT:   Head: Normocephalic and atraumatic  Right Ear: External ear normal    Left Ear: External ear normal    Mouth/Throat: Oropharynx is clear and moist  No oropharyngeal exudate  Eyes: Conjunctivae are normal  Pupils are equal, round, and reactive to light  Neck: No JVD present  No tracheal deviation present  Cardiovascular: Normal rate, regular rhythm and normal heart sounds  Exam reveals no gallop and no friction rub  No murmur heard  Pulmonary/Chest: Effort normal and breath sounds normal  No respiratory distress  She has no wheezes  She has no rales  She exhibits no tenderness  Abdominal: Soft  Bowel sounds are normal  She exhibits no distension  There is no tenderness  There is no guarding  Musculoskeletal: Normal range of motion  She exhibits no edema, tenderness or deformity  Lymphadenopathy:     She has no cervical adenopathy  Neurological: She is alert  Skin: Skin is warm and dry  No rash noted  She is not diaphoretic  No erythema  Psychiatric: She has a normal mood and affect  Her behavior is normal    Nursing note and vitals reviewed        ED Medications  Medications - No data to display    Diagnostic Studies  Results Reviewed     Procedure Component Value Units Date/Time    Urine Microscopic [[id number]]  (Abnormal) Collected:  04/09/18 0957    Lab Status:  Final result Specimen:  Urine from Urine, Clean Catch Updated:  04/09/18 1011     RBC, UA 0-1 (A) /hpf      WBC, UA 0-1 (A) /hpf Epithelial Cells Occasional /hpf      Bacteria, UA Occasional /hpf     ED Urine Macroscopic [[id number]]  (Abnormal) Collected:  04/09/18 0957    Lab Status:  Final result Specimen:  Urine Updated:  04/09/18 0957     Color, UA Yellow     Clarity, UA Clear     pH, UA 5 5     Leukocytes, UA Negative     Nitrite, UA Negative     Protein, UA Trace (A) mg/dl      Glucose, UA Negative mg/dl      Ketones, UA Negative mg/dl      Urobilinogen, UA 0 2 E U /dl      Bilirubin, UA Negative     Blood, UA Trace (A)     Specific [place], UA 1 020    Narrative:       CLINITEK RESULT    Troponin I [[id number]]  (Normal) Collected:  04/09/18 0919    Lab Status:  Final result Specimen:  Blood from Arm, Right Updated:  04/09/18 0947     Troponin I <0 02 ng/mL     Narrative:         [employer] Chemistry analyzer 99% cutoff is > 0 04 ng/mL in network labs    o cTnI 99% cutoff is useful only when applied to patients in the clinical setting of myocardial ischemia  o cTnI 99% cutoff should be interpreted in the context of clinical history, ECG findings and possibly cardiac imaging to establish correct diagnosis  o cTnI 99% cutoff may be suggestive but clearly not indicative of a coronary event without the clinical setting of myocardial ischemia      Comprehensive metabolic panel [[id number]] Collected:  04/09/18 0919    Lab Status:  Final result Specimen:  Blood from Arm, Right Updated:  04/09/18 0945     Sodium 138 mmol/L      Potassium 4 2 mmol/L      Chloride 102 mmol/L      CO2 29 mmol/L      Anion Gap 7 mmol/L      BUN 20 mg/dL      Creatinine 0 94 mg/dL      Glucose 121 mg/dL      Calcium 9 9 mg/dL      AST 22 U/L      ALT 28 U/L      Alkaline Phosphatase 70 U/L      Total Protein 7 0 g/dL      Albumin 3 5 g/dL      Total Bilirubin 0 40 mg/dL      eGFR 54 ml/min/1 73sq m     Narrative:         National Kidney Disease Education Program recommendations are as follows:  GFR calculation is accurate only with a steady state creatinine  Chronic Kidney disease less than 60 ml/min/1 73 sq  meters  Kidney failure less than 15 ml/min/1 73 sq  meters  CBC and differential [[id number]]  (Abnormal) Collected:  04/09/18 0919    Lab Status:  Final result Specimen:  Blood from Arm, Right Updated:  04/09/18 0930     WBC 3 71 (L) Thousand/uL      RBC 3 72 (L) Million/uL      Hemoglobin 12 0 g/dL      Hematocrit 36 0 %      MCV 97 fL      MCH 32 3 pg      MCHC 33 3 g/dL      RDW 12 4 %      MPV 9 1 fL      Platelets 132 Thousands/uL      Neutrophils Relative 57 %      Lymphocytes Relative 32 %      Monocytes Relative 9 %      Eosinophils Relative 2 %      Basophils Relative 0 %      Neutrophils Absolute 2 11 Thousands/µL      Lymphocytes Absolute 1 18 Thousands/µL      Monocytes Absolute 0 33 Thousand/µL      Eosinophils Absolute 0 08 Thousand/µL      Basophils Absolute 0 01 Thousands/µL                  CT head without contrast   Final Result by Yoli Valdivia MD (04/09 6153)      No acute intracranial abnormality  Moderate chronic small vessel ischemic changes  Old bilateral basal ganglia lacunar infarcts  Workstation performed: [id number]         XR chest 2 views   Final Result by Amrit Golden MD (04/09 9460)      No acute cardiopulmonary disease              Workstation performed: [id number]                    Procedures  ECG 12 Lead Documentation  Date/Time: 4/9/2018 10:37 AM  Performed by: Slime Ren  Authorized by: Arcenio Medrano     Indications / Diagnosis:  Dizziness  ECG reviewed by me, the ED Provider: yes    Patient location:  ED  Previous ECG:     Previous ECG:  Compared to current    Comparison ECG info:  3/17/13    Similarity:  No change  Interpretation:     Interpretation: normal    Rate:     ECG rate:  58    ECG rate assessment: bradycardic    Rhythm:     Rhythm: sinus bradycardia    Ectopy:     Ectopy: none    QRS:     QRS axis:  Normal    QRS intervals:  Normal  Conduction:     Conduction: normal    ST segments: ST segments:  Normal  T waves:     T waves: normal             Phone Contacts  ED Phone Contact    ED Course  ED Course                                MDM  Number of Diagnoses or Management Options  Encephalopathy:   Diagnosis management comments: Differential diagnosis includes but not limited to:  TIA, CVA, worsening dementia, urinary tract infection, other intra-abdominal infection, encephalopathy  Amount and/or Complexity of Data Reviewed  Clinical lab tests: ordered  Tests in the radiology section of CPT®: ordered and reviewed      CritCare Time    Disposition  Final diagnoses:   Encephalopathy - suspected due to possible UTI     Time reflects when diagnosis was documented in both MDM as applicable and the Disposition within this note     Time User Action Codes Description Comment    4/9/2018 10:32 AM Cloyce Colace Add [G93 40] Encephalopathy     4/9/2018 10:33 AM Cloyce Colace Modify [G93 40] Encephalopathy suspected due to possible UTI      ED Disposition     ED Disposition Condition Comment    Admit  Case was discussed with [facility] and the patient's admission status was agreed to be Admission Status: inpatient status to the service of Dr Basia Lowry   Follow-up Information    None       Patient's Medications   Discharge Prescriptions    No medications on file     No discharge procedures on file      ED Provider  Electronically Signed by           Gilbert Mendoza PA-C  04/09/18 524 MARIA L Fu PA-C  04/09/18 1038

## 2018-04-09 NOTE — H&P
H&P- Hilda York 11/22/1927, 80 y o  female MRN: [id number]    Unit/Bed#: ED 08 Encounter: [id number]    Primary Care Provider: Keegan Samuel MD   Date and time admitted to hospital: 4/9/2018  8:02 AM  * Acute encephalopathy   Assessment & Plan    ·  Patient presented with worsening visual hallucinations, confusion and some agitation as noted per her daughter  She did not respond to low-dose oral Ativan at home  ·   Will add Risperdal 0 5 mg melt tab twice a day and observe  · Assess for acute reversible etiology such as hypertensive encephalopathy versus dehydration vs toxic metabolic-- Check G56, folate, TSH, A1c, NH3  ·  trial IV fluids  ·  initial head CT showed old cerebral vascular disease, consider repeat head CT if any concern for possible stroke-- thus far no clear evidence to suggest acute stroke        Alzheimer's dementia with behavioral disturbance   Assessment & Plan    ·  Consult physical and occupational therapy to assess for any skillable need  ·  suspect she may have had progression of her underlying dementia which could also have been contributing to her current mental status  ·   Also involved case management-- sounds as if patient needs long-term placement at this juncture  They will be involved to assist in placement        Accelerated hypertension   Assessment & Plan    ·  Recommend better blood pressure control-- continue lotrel and tenormin but hold HCTZ in case of possible mild dehydration          VTE Prophylaxis: Enoxaparin (Lovenox)  / sequential compression device   Code Status: DNR/DNI  POLST: There is no POLST form on file for this patient (pre-hospital)  Discussion with family: spoke with daughter in detail    Anticipated Length of Stay:  Patient will be admitted on an Observation basis with an anticipated length of stay of less than 2 midnights  Justification for Hospital Stay:     Total Time for Visit, including Counseling / Coordination of Care: 1 hour    Greater than 50% of this total time spent on direct patient counseling and coordination of care  Chief Complaint:    Worsening confusion    History of Present Illness:    Gerhardt Dryer is a 80 y o  female who was brought in by family for worsening confusion  Patient herself is a very poor historian and states "I thought I was here for a prayer group "  She does not offer any complaints of pain but again provides very little useful history  Her daughter who showed up a short time later was able to provide more details and states that her mom does have underlying dementia but for the last night or so has been much more confused and had difficulty finding words she wanted to say and was having visual hallucinations  Some of these were quite concerning and distressing to the patient including repeatedly asking if she had "hurt the baby "  She does not seem to be running a fever or have had any specific decreased intake  She has not had any recent falls or head injuries  No medication changes have been made lately with the exception that the daughter did try to give her a low dose of lorazepam which did not seem to help  Patient's daughter does report that she feels she can no longer provide enough care for her mom who has been going downhill and feels she would be best suited in a nursing home at this point  She has already reached out to [facility] to start paperwork to transition her there for long-term care  Overall the goal is minimal testing  Review of Systems:    Review of Systems   Constitutional: Negative for activity change, appetite change, chills, fatigue, fever and unexpected weight change  Appetite has gone down over time but not drastically recently   HENT: Negative for congestion and trouble swallowing  Eyes: Negative for visual disturbance  Respiratory: Negative for cough, choking, chest tightness, shortness of breath and wheezing      Cardiovascular: Negative for chest pain, palpitations and leg swelling  Gastrointestinal: Positive for diarrhea (chronic mild diarrhea)  Negative for abdominal distention, abdominal pain, blood in stool, constipation, nausea and vomiting  Genitourinary: Negative for difficulty urinating and dysuria  Musculoskeletal: Positive for back pain (chronic pain from spinal stenosis)  Negative for arthralgias, gait problem and myalgias  Skin: Negative for color change, pallor, rash and wound  Neurological: Positive for speech difficulty  Negative for dizziness, tremors, seizures, syncope, weakness, light-headedness and headaches  Psychiatric/Behavioral: Positive for agitation, confusion and hallucinations  Past Medical and Surgical History:     Past Medical History:   Diagnosis Date    Alzheimer disease     Hypertension     Macular degeneration     Spinal stenosis        Past Surgical History:   Procedure Laterality Date    COLON SURGERY         Meds/Allergies:    Prior to Admission medications    Medication Sig Start Date End Date Taking?  Authorizing Provider   amLODIPine-benazepril (LOTREL 5-20) 5-20 MG per capsule Take 1 capsule by mouth daily   Yes Historical Provider, MD   aspirin (ECOTRIN LOW STRENGTH) 81 mg EC tablet Take 81 mg by mouth daily   Yes Historical Provider, MD   atenolol (TENORMIN) 50 mg tablet TAKE ONE TABLET ONCE DAILY 2/27/18  Yes Cherie Hunter MD   cholecalciferol (VITAMIN D3) 1,000 units tablet Take 2,000 Units by mouth daily   Yes Historical Provider, MD   escitalopram (LEXAPRO) 10 mg tablet Take 10 mg by mouth daily   Yes Historical Provider, MD   folic acid (FOLVITE) 1 mg tablet Take by mouth daily   Yes Historical Provider, MD   hydrochlorothiazide (MICROZIDE) 12 5 mg capsule TAKE 1 CAPSULE DAILY EVERY MORNING 3/26/18  Yes Cherie Hunter MD   isosorbide mononitrate (IMDUR) 30 mg 24 hr tablet Take 30 mg by mouth daily   Yes Historical Provider, MD   pantoprazole (PROTONIX) 40 mg tablet Take 40 mg by mouth daily   Yes Historical Provider, MD   rivastigmine (EXELON) 3 mg capsule TAKE 1 CAPSULE TWICE DAILY -TAKE WITH FOOD -MAY CAUSE DROWSINESS ORDIZZINESS 3/14/18  Yes Arlyn Hampton MD     I have reviewed home medications with patient family member  Allergies: Allergies   Allergen Reactions    Penicillins     Sulfa Antibiotics        Social History:     Marital Status:    Occupation: none  Patient Pre-hospital Living Situation:  Lives with daughter and has caregivers at home  Patient Pre-hospital Level of Mobility:  Ambulates with walker  Patient Pre-hospital Diet Restrictions:  No special modification  Substance Use History:   History   Alcohol Use No     History   Smoking Status    Never Smoker   Smokeless Tobacco    Never Used     History   Drug Use No       Family History:    non-contributory    Physical Exam:     Vitals:   Blood Pressure: 138/96 (04/09/18 1400)  Pulse: 73 (04/09/18 1400)  Temperature: 97 7 °F (36 5 °C) (04/09/18 0810)  Temp Source: Oral (04/09/18 0810)  Respirations: 14 (04/09/18 1400)  Weight - Scale: 84 4 kg (186 lb 1 1 oz) (04/09/18 0806)  SpO2: 98 % (04/09/18 1400)    Physical Exam   Constitutional: She appears well-developed and well-nourished  HENT:   Extremely hard of hearing   Eyes: Conjunctivae are normal  Right eye exhibits no discharge  Left eye exhibits no discharge  No scleral icterus  Neck: Neck supple  Cardiovascular: Normal rate, regular rhythm and normal heart sounds  No murmur heard  Pulmonary/Chest: No respiratory distress  She has no wheezes  She has no rales  Very limited effort provided  No dyspnea or cough noted  Abdominal: Soft  Bowel sounds are normal  She exhibits no distension  There is no tenderness  There is no rebound and no guarding  Musculoskeletal: She exhibits no edema, tenderness or deformity  Neurological: She is alert  Awake alert and interactive  No dysarthria noted  No facial asymmetry noted  Follows commands sporadically    Names 2/2 objects appropriately  Able to tell me her name after being asked twice ( the 1st time she told me she did not have one)  Could not tell me the current location appropriately and said this buildingwas part of a banking system  Skin: Skin is warm and dry  No rash noted  She is not diaphoretic  No erythema  No pallor  Psychiatric:   Did not appreciate any over hallucinations  She is minimally cooperative but again her hearing impairment also seems to be playing a role  She was calm and not combative with me   Nursing note and vitals reviewed  Additional Data:     Lab Results: I have personally reviewed pertinent reports  Results from last 7 days  Lab Units 04/09/18  0919   WBC Thousand/uL 3 71*   HEMOGLOBIN g/dL 12 0   HEMATOCRIT % 36 0   PLATELETS Thousands/uL 249   NEUTROS PCT % 57   LYMPHS PCT % 32   MONOS PCT % 9   EOS PCT % 2       Results from last 7 days  Lab Units 04/09/18 0919   SODIUM mmol/L 138   POTASSIUM mmol/L 4 2   CHLORIDE mmol/L 102   CO2 mmol/L 29   BUN mg/dL 20   CREATININE mg/dL 0 94   CALCIUM mg/dL 9 9   TOTAL PROTEIN g/dL 7 0   BILIRUBIN TOTAL mg/dL 0 40   ALK PHOS U/L 70   ALT U/L 28   AST U/L 22   GLUCOSE RANDOM mg/dL 121           Imaging: I have personally reviewed pertinent reports  CT head without contrast   Final Result by Anya Velasquez MD (04/09 7122)      No acute intracranial abnormality  Moderate chronic small vessel ischemic changes  Old bilateral basal ganglia lacunar infarcts  Workstation performed: [id number]         XR chest 2 views   Final Result by Johan Lara MD (04/09 0423)      No acute cardiopulmonary disease  Workstation performed: [id number]             EKG, Pathology, and Other Studies Reviewed on Admission:   · EKG: sinus manjinder    Allscripts / Epic Records Reviewed: Yes --reviewed records from PCP appointment this past fall    ** Please Note: This note has been constructed using a voice recognition system  **

## 2018-04-09 NOTE — ED NOTES
EKG completed at 09:23, viewed and signed by Dr Gera Henriquez, copy on chart     [place] Mediate  04/09/18 9337

## 2018-04-09 NOTE — ED NOTES
Unable to obtain IV on patient  Pt currently tried to hit nurse trying to obtain IV         Ledy Torres RN  04/09/18 6497

## 2018-04-09 NOTE — ED NOTES
Daughter remains at bedside with patient  Pt is cooperative at this time          Mega Quiroga RN  04/09/18 4161

## 2018-04-10 VITALS
OXYGEN SATURATION: 96 % | BODY MASS INDEX: 33.43 KG/M2 | WEIGHT: 181.66 LBS | HEIGHT: 62 IN | TEMPERATURE: 97.4 F | HEART RATE: 63 BPM | RESPIRATION RATE: 18 BRPM | SYSTOLIC BLOOD PRESSURE: 166 MMHG | DIASTOLIC BLOOD PRESSURE: 77 MMHG

## 2018-04-10 LAB
ANION GAP SERPL CALCULATED.3IONS-SCNC: 10 MMOL/L (ref 4–13)
ATRIAL RATE: 59 BPM
BUN SERPL-MCNC: 13 MG/DL (ref 5–25)
CALCIUM SERPL-MCNC: 9.2 MG/DL
CHLORIDE SERPL-SCNC: 104 MMOL/L (ref 100–108)
CO2 SERPL-SCNC: 27 MMOL/L (ref 21–32)
CREAT SERPL-MCNC: 0.77 MG/DL (ref 0.6–1.3)
EST. AVERAGE GLUCOSE BLD GHB EST-MCNC: 120 MG/DL
GFR SERPL CREATININE-BSD FRML MDRD: 68 ML/MIN/1.73SQ M
GLUCOSE P FAST SERPL-MCNC: 114 MG/DL (ref 65–99)
GLUCOSE SERPL-MCNC: 114 MG/DL (ref 65–140)
HBA1C MFR BLD: 5.8 % (ref 4.2–6.3)
P AXIS: 90 DEGREES
POTASSIUM SERPL-SCNC: 3.6 MMOL/L (ref 3.5–5.3)
PR INTERVAL: 204 MS
QRS AXIS: 30 DEGREES
QRSD INTERVAL: 76 MS
QT INTERVAL: 426 MS
QTC INTERVAL: 415 MS
SODIUM SERPL-SCNC: 141 MMOL/L (ref 136–145)
T WAVE AXIS: 89 DEGREES
VENTRICULAR RATE: 57 BPM

## 2018-04-10 PROCEDURE — 80048 BASIC METABOLIC PNL TOTAL CA: CPT | Performed by: PHYSICIAN ASSISTANT

## 2018-04-10 PROCEDURE — 93010 ELECTROCARDIOGRAM REPORT: CPT | Performed by: INTERNAL MEDICINE

## 2018-04-10 PROCEDURE — G8978 MOBILITY CURRENT STATUS: HCPCS

## 2018-04-10 PROCEDURE — G8987 SELF CARE CURRENT STATUS: HCPCS

## 2018-04-10 PROCEDURE — 99217 PR OBSERVATION CARE DISCHARGE MANAGEMENT: CPT | Performed by: PHYSICIAN ASSISTANT

## 2018-04-10 PROCEDURE — G8979 MOBILITY GOAL STATUS: HCPCS

## 2018-04-10 PROCEDURE — 83036 HEMOGLOBIN GLYCOSYLATED A1C: CPT | Performed by: PHYSICIAN ASSISTANT

## 2018-04-10 PROCEDURE — 97167 OT EVAL HIGH COMPLEX 60 MIN: CPT

## 2018-04-10 PROCEDURE — 97163 PT EVAL HIGH COMPLEX 45 MIN: CPT

## 2018-04-10 PROCEDURE — G8988 SELF CARE GOAL STATUS: HCPCS

## 2018-04-10 RX ORDER — RISPERIDONE 0.5 MG/1
0.5 TABLET, ORALLY DISINTEGRATING ORAL 2 TIMES DAILY
Refills: 0
Start: 2018-04-10

## 2018-04-10 RX ADMIN — PANTOPRAZOLE SODIUM 40 MG: 40 TABLET, DELAYED RELEASE ORAL at 06:13

## 2018-04-10 RX ADMIN — ESCITALOPRAM OXALATE 10 MG: 10 TABLET ORAL at 08:47

## 2018-04-10 RX ADMIN — RIVASTIGMINE TARTRATE 3 MG: 1.5 CAPSULE ORAL at 08:47

## 2018-04-10 RX ADMIN — ENOXAPARIN SODIUM 40 MG: 40 INJECTION SUBCUTANEOUS at 08:49

## 2018-04-10 RX ADMIN — VITAMIN D, TAB 1000IU (100/BT) 2000 UNITS: 25 TAB at 08:47

## 2018-04-10 RX ADMIN — RISPERIDONE 0.5 MG: 0.5 TABLET, ORALLY DISINTEGRATING ORAL at 17:21

## 2018-04-10 RX ADMIN — FOLIC ACID 1 MG: 1 TABLET ORAL at 08:47

## 2018-04-10 RX ADMIN — RISPERIDONE 0.5 MG: 0.5 TABLET, ORALLY DISINTEGRATING ORAL at 12:48

## 2018-04-10 RX ADMIN — ISOSORBIDE MONONITRATE 30 MG: 30 TABLET, EXTENDED RELEASE ORAL at 08:47

## 2018-04-10 RX ADMIN — ASPIRIN 81 MG: 81 TABLET, COATED ORAL at 08:47

## 2018-04-10 RX ADMIN — BENAZEPRIL HYDROCHLORIDE: 10 TABLET, FILM COATED ORAL at 08:47

## 2018-04-10 RX ADMIN — ATENOLOL 50 MG: 50 TABLET ORAL at 08:47

## 2018-04-10 RX ADMIN — RIVASTIGMINE TARTRATE 3 MG: 1.5 CAPSULE ORAL at 17:21

## 2018-04-10 NOTE — PLAN OF CARE
Problem: OCCUPATIONAL THERAPY ADULT  Goal: Performs self-care activities at highest level of function for planned discharge setting  See evaluation for individualized goals  Treatment Interventions: ADL retraining, Functional transfer training, UE strengthening/ROM, Cognitive reorientation, Patient/family training, Equipment evaluation/education, Compensatory technique education, Continued evaluation, Energy conservation  Equipment Recommended: Bedside commode, Tub seat with back       See flowsheet documentation for full assessment, interventions and recommendations  Limitation: Decreased ADL status, Decreased UE ROM, Decreased Safe judgement during ADL, Decreased cognition, Decreased endurance, Decreased self-care trans     Assessment: Pt is a 81yo female [facility]9/[facility]  Pt presents w/ acute encephalopathy and signficant PMH impacting her occupational performance including HTN, Alzheimers, [place]  Pt unable to provide social history upon evaluation  Details obtained from pt's chart  Pt lives w/ her daughter and has 24/7 assist / supervision at baseline between aide and daughter  Pt needs assistance w/ ADL and uses RW for functional mobility  Pt demonstrated B UE AROM WFL to complete ADL although limited at shoulder past 90*  Pt required mod A to complete bed mobility  Pt required max A x1 initially for sit to stand and transfer to commode, but progressed to mod A x1, stand by A of 2nd and significant physical prompts / cues  Pt presents w/ decreased activity tolerance, generalized weakness / deconditioning, decreased standing balance, decreased memory / recall, decreased attention impacting her I and safety w/ dressing, bathing, oral hygiene, functional transfers, functional mobiltiy, clothing mgmt, and activity engagement  Pt would benefit from OT while in acute care to address deficits   From an OT perspective, pt would benefit from post acute rehab when medically stable for discharge from acute care  Will continue to follow     OT Discharge Recommendation:  (to post acute rehab when medically stable)  OT - OK to Discharge:  (to post acute rehab when stable)

## 2018-04-10 NOTE — PLAN OF CARE
Problem: PHYSICAL THERAPY ADULT  Goal: Performs mobility at highest level of function for planned discharge setting  See evaluation for individualized goals  Treatment/Interventions: Functional transfer training, LE strengthening/ROM, Therapeutic exercise, Endurance training, Cognitive reorientation, Patient/family training, Equipment eval/education, Bed mobility, Gait training  Equipment Recommended: Albert Chavez       See flowsheet documentation for full assessment, interventions and recommendations  Prognosis: Fair  Problem List: Decreased strength, Decreased endurance, Impaired balance, Decreased mobility, Decreased cognition, Impaired judgement, Decreased safety awareness, Impaired hearing  Assessment: Pt is a 80 y o  female seen for PT evaluation s/p admit to [facility] on 4/9/2018 w/ Acute encephalopathy  Order placed for PT  Comorbidities affecting pt's physical performance at time of assessment listed above  Personal factors affecting pt at time of IE include: advanced age, past experience, inability to perform IADLs, inability to perform ADLs, inability to ambulate household distances and limited insight into impairments  Prior to admission, pt was living with her daughter and using RW for ambulation  Unknown level of assist required, however per chart, pt also has caretakers  Pt is a poor historian, reporting she lives with her "parents"  Upon evaluation: Pt initially requires mod A x1 for bed mobility, max A x2 for sit to stand, and max A x2 for SPT  However, pt improving with number of trials and with RW to mod A x1 for sit to stand and SPT  (Please find full objective findings from PT assessment regarding body systems outlined above)  Impairments and limitations also listed above, especially due to  weakness, impaired balance, decreased endurance, gait deviations, decreased activity tolerance, decreased safety awareness, impaired judgement, fall risk and decreased cognition   The following objective measures performed on IE also reveal limitations: Barthel Index 25/100  Pt's clinical presentation is currently unstable/unpredictable seen in pt's presentation of altered mental status, decreased safety awareness, and decreased motor commands followed  Pt to benefit from continued skilled PT tx while in hospital and upon DC to address deficits as defined above and maximize level of functional mobility  From PT/mobility standpoint, recommendation at time of d/c would be inpatient rehab pending progress in order to maximize pt's functional independence and consistency w/ mobility in order to facilitate return to PLOF  Recommend progression of ambulation, initiation of HEP, and dynamic balance activities as appropriate  Recommendation: Post acute IP rehab          See flowsheet documentation for full assessment

## 2018-04-10 NOTE — ASSESSMENT & PLAN NOTE
·  Consults placed to physical and occupational therapy to assess for any skillable need  ·  suspect  progression of her underlying dementia which could also have been contributing to her current mental status    · Needs placement

## 2018-04-10 NOTE — ASSESSMENT & PLAN NOTE
· Patient presented with worsening visual hallucinations, confusion and some agitation as noted per her daughter  She did not respond to low-dose oral Ativan at home   Likely progression of underlying dementia  · Added Risperdal 0 5 mg melt tab twice a day  · No clear reversible etiology identified  · Gave IVF x 1L and stopped HCTZ  · head CT showed old cerebral vascular disease  · Needs SNF placement for higher level of care

## 2018-04-10 NOTE — DISCHARGE SUMMARY
Discharge- Michelle Garrett 11/22/1927, 80 y o  female MRN: [id number]    Unit/Bed#: -01 Encounter: [id number]    Primary Care Provider: Arlyn Hampton MD   Date and time admitted to hospital: 4/9/2018  8:02 AM  * Acute encephalopathy   Assessment & Plan    · Patient presented with worsening visual hallucinations, confusion and some agitation as noted per her daughter  She did not respond to low-dose oral Ativan at home  Likely progression of underlying dementia  · Added Risperdal 0 5 mg melt tab twice a day  · No clear reversible etiology identified  · Gave IVF x 1L and stopped HCTZ  · head CT showed old cerebral vascular disease  · Needs SNF placement for higher level of care        Alzheimer's dementia with behavioral disturbance   Assessment & Plan    ·  Consults placed to physical and occupational therapy to assess for any skillable need  ·  suspect  progression of her underlying dementia which could also have been contributing to her current mental status  · Needs placement        Accelerated hypertension   Assessment & Plan    · Recommend better blood pressure control-- continue lotrel and tenormin but hold HCTZ in case of possible mild dehydration--seems to be stable at this point          Discharging Physician / Practitioner: Alicia Morejon PA-C  PCP: Arlyn Hampton MD  Admission Date: 4/9/2018  Discharge Date: 04/10/18    Disposition:      Other: Long-term placement    For Discharges to [facility] SNF:   · [place]    Reason for Admission: worsening hallucinations    Discharge Diagnoses:     Please see assessment and plan section above for further details regarding discharge diagnoses       Resolved Problems  Date Reviewed: 4/10/2018    None        Consultations During Hospital Stay:  · none    Procedures Performed:     · See below     Medication Adjustments and Discharge Medications:  · Summary of Medication Adjustments made as a result of this hospitalization: added risperdal  · Medication Dosing Tapers - Please refer to Discharge Medication List for details on any medication dosing tapers (if applicable to patient)  · Medications being temporarily held (include recommended restart time): stopped HCTZ  · Discharge Medication List: See after visit summary for reconciled discharge medications  Wound Care Recommendations:  When applicable, please see wound care section of After Visit Summary  Diet Recommendations at Discharge:  Diet -        Diet Orders            Start     Ordered    04/09/18 1705  Room Service  Once     Question:  Type of Service  Answer:  Room Service - Appropriate with Assistance    04/09/18 1705    04/09/18 1449  Diet Regular; Regular House  (ED Bridging Orders Panel)  Diet effective now     Question Answer Comment   Diet Type Regular    Regular Regular House    RD to adjust diet per protocol? Yes        04/09/18 1448          Instructions for any Catheters / Lines Present at Discharge (including removal date, if applicable: none    Significant Findings / Test Results:     CT head without contrast   Final Result by Holden Shah MD (04/09 4982)      No acute intracranial abnormality  Moderate chronic small vessel ischemic changes  Old bilateral basal ganglia lacunar infarcts  Workstation performed: [id number]         XR chest 2 views   Final Result by Artie Goel MD (04/09 5000)      No acute cardiopulmonary disease  Workstation performed: [id number]           Incidental Findings:   · none    Test Results Pending at Discharge (will require follow up):   · none     Outpatient Tests Requested:  · none    Complications:  none    Hospital Course:     Lizzie Mas is a 80 y o  female patient who originally presented to the hospital on 4/9/2018 due to worsening confusion and hallucinations    She lives with her daughter and has known baseline dementia but for the past several days has been more confused, agitated, and hallucinating  Her daughter attempted to give her a dose of Ativan at home without any benefit  She brought her in to get a urinalysis to see if she has a urinary tract infection and also to get placed in a nursing home as she had already been pursuing this as an outpatient  On admission she was noted to have somewhat uncontrolled hypertension but this resolved quickly  In addition she got 1 L of IV fluids and we discontinued her hydrochlorothiazide so as to avoid any element of dehydration  She had basic workup including CT scan of the head, urinalysis, electrolytes assessment, B12, folate, TSH, ammonia level all which were unremarkable  I suspect her decompensation is simply a progression of her underlying advanced dementia and we have initiated Risperdal to assist with symptom management  Case management was involved to assist in long-term placement of the patient to [facility]    Condition at Discharge: poor     Discharge Day Visit / Exam:     Subjective:  No events reported by nursing staff  It seems she slept well and was not agitated  Patient herself is an unreliable historian who could not provide any information  Vitals: Blood Pressure: 144/66 (04/09/18 2232)  Pulse: 79 (04/09/18 2232)  Temperature: 98 3 °F (36 8 °C) (04/09/18 2232)  Temp Source: Oral (04/09/18 2232)  Respirations: 18 (04/09/18 2232)  Height: 5' 2" (157 5 cm) (04/09/18 1715)  Weight - Scale: 82 4 kg (181 lb 10 5 oz) (04/09/18 1647)  SpO2: 95 % (04/09/18 2232)  Exam:   Physical Exam   Constitutional: She appears well-developed and well-nourished  HENT:   Head: Normocephalic and atraumatic  Eyes: Conjunctivae are normal  Right eye exhibits no discharge  Left eye exhibits no discharge  No scleral icterus  Neck: Neck supple  Cardiovascular: Normal rate and normal heart sounds  No murmur heard  Pulmonary/Chest: No stridor  No respiratory distress  She has no wheezes  She has no rales  Abdominal: Soft   She exhibits no distension  There is no tenderness  Musculoskeletal: She exhibits no edema  Neurological: She is alert  Awake alert and interactive  She was calm and not combative  She had no evidence of dysarthria  She was able to name a telephone and describe how to use it  Unfortunately she could not tell me where we were and when asked to introduce me to her daughter she called her her mother and told me her name was "Niko Hernandez "   Skin: Skin is warm and dry  No rash noted  No erythema  No pallor  Nursing note and vitals reviewed  Discussion with Family:  Spoke with daughter at bedside    Goals of Care Discussions:  · Code Status at Discharge: Level 3 - DNAR and DNI  · Were there any Goals of Care Discussions during Hospitalization?: Yes  · Results of any General Goals of Care Discussions: Focus on limiting tests   · POLST Completed: No   · If POLST Completed, Summary of POLST Agreement Provided Here:    · OK to Rehospitalize if Needed? Yes    Discharge instructions/Information to patient and family:   See after visit summary section titled Discharge Instructions for information provided to patient and family  Planned Readmission: no     Discharge Statement:  I spent 35 minutes discharging the patient  This time was spent on the day of discharge  I had direct contact with the patient on the day of discharge  Greater than 50% of the total time was spent examining patient, answering all patient questions, arranging and discussing plan of care with patient as well as directly providing post-discharge instructions  Additional time then spent on discharge activities  Case discussed with case management and performed bedside nursing rounds    ** Please Note: This note has been constructed using a voice recognition system **

## 2018-04-10 NOTE — PLAN OF CARE
DISCHARGE PLANNING     Discharge to home or other facility with appropriate resources Progressing        Nutrition/Hydration-ADULT     Nutrient/Hydration intake appropriate for improving, restoring or maintaining nutritional needs Progressing        Potential for Falls     Patient will remain free of falls Progressing        Prexisting or High Potential for Compromised Skin Integrity     Skin integrity is maintained or improved Progressing        SAFETY ADULT     Maintain or return to baseline ADL function Progressing     Maintain or return mobility status to optimal level Progressing        SKIN/TISSUE INTEGRITY - ADULT     Skin integrity remains intact Progressing     Incision(s), wounds(s) or drain site(s) healing without S/S of infection Progressing     Oral mucous membranes remain intact Progressing

## 2018-04-10 NOTE — OCCUPATIONAL THERAPY NOTE
[address]  Evaluation     Patient Name: Mone WITT Date: 4/10/2018  Problem List  Patient Active Problem List   Diagnosis    Acute encephalopathy    Alzheimer's dementia with behavioral disturbance    Accelerated hypertension     Past Medical History  Past Medical History:   Diagnosis Date    Alzheimer disease     Hypertension     Macular degeneration     Spinal stenosis      Past Surgical History  Past Surgical History:   Procedure Laterality Date    COLON SURGERY        04/10/18 1059   Note Type   Note type Eval/Treat   Restrictions/Precautions   Other Precautions Chair Alarm;Cognitive; Bed Alarm; Fall Risk;Hard of hearing   Pain Assessment   Pain Assessment No/denies pain   Pain Score No Pain   Home Living   Type of Home House   Bathroom Accessibility Accessible via walker   [address] [place] [address],[address]   Additional Comments Pt not accurate historian upon evaluation  Social history obtained from pt's chart  Per cahrt, pt lives w/ her daugther   Prior Function   Level of [place] Needs assistance with IADLs   Lives With Daughter   Receives Help From Family   ADL Assistance Needs assistance   IADLs Needs assistance   Falls in the last 6 months (pt unable to report)   Comments Per chart, pt requires assistance w/ ADL at baseline PTA, and has 24/7 assist at home between aide and daughter   Lifestyle   Autonomy Pt needs assistance w/ ADL/ IADL, and uses RW   Reciprocal Relationships Pt lives w/ her daughter per chart and has aide   Service to Others Pt is retired; unable to report work history   Intrinsic Gratification Pt unable to report upon evaluation   ADL   Eating Assistance Unable to assess   UB Bathing Assistance 2  Maximal Assistance   LB Bathing Assistance 2  Maximal Assistance   [address] 3  Moderate Assistance   UB Dressing Deficit Setup;Verbal cueing;Supervision/safety; Increased time to complete   LB Dressing Assistance Unable to assess   Toileting Assistance  2 Maximal Assistance   Toileting Deficit Clothing management up;Clothing management down;Perineal hygiene; Bedside commode   Additional Comments Pt benefits from significant verbal and physical prompts / cues to sustain attention and follow directions during ADL performance   Bed Mobility   Supine to Sit 3  Moderate assistance   Additional items Assist x 1;Bedrails; Increased time required;Verbal cues   Sit to Supine Unable to assess   Additional Comments Pt seated OOB in chair at end of eval w/ call bell in reach and chair alarm activated   Transfers   Sit to Stand 2  Maximal assistance  (progressed to mod A x 1)   Additional items Assist x 2   Stand to Sit 2  Maximal assistance   Additional items Assist x 2   Stand pivot 2  Maximal assistance   Additional items Assist x 2; Increased time required;Verbal cues;Armrests  (progressed to mod A x 1)   Toilet transfer 2  Maximal assistance  (max A x2 bed to commode)   Additional items Assist x 2;Commode;Armrests; Verbal cues; Other  (progressed to mod A x1 commode to bed)   Additional Comments Pt progreseed to mod A x1, stand by A of 2nd using RW bed to chair   Balance   Static Sitting Fair   Static Standing Poor   Activity Tolerance   Activity Tolerance Patient limited by fatigue;Treatment limited secondary to medical complications (Comment)  (decreased cognition, lethargic)   Medical Staff Made Aware spoke to [facility] Sushila and Jayne MCGRAW   Nurse Made Aware spoke to RNRenny   RUE Assessment   RUE Assessment (able to complete ADL, limited shoulder AROM past 90)   RUE Strength   RUE Overall Strength Deficits;Due to cognitive deficits  (difficulty following directions)   LUE Assessment   LUE Assessment (limited shoulder AROM past 90*, able to complete ADL)   LUE Strength   LUE Overall Strength Deficits;Due to cognitive deficits  (difficulty following directions)   Cognition   Overall Cognitive Status Impaired   Arousal/Participation Arousable;Lethargic   Attention Difficulty attending to directions   Orientation Level Oriented to person;Disoriented to situation;Disoriented to time;Disoriented to place   Memory Decreased long term memory;Decreased short term memory;Decreased recall of biographical information;Decreased recall of recent events;Decreased recall of precautions   Following Commands Follows one step commands inconsistently   Comments Identified pt by full name and wrist band  Pt able to report birth month, but not date accurately  Diffiuclty following one step directions during functional transfers and ADL performance  Benefits from physical cues / prompts for hand placement  Decreased recall bio info and recent events  Pt unable to provide social history upon evaluation   Assessment   Limitation Decreased ADL status; Decreased UE ROM; Decreased Safe judgement during ADL;Decreased cognition;Decreased endurance;Decreased self-care trans   Assessment Pt is a 81yo female admitted to [address] [place] [address] on 4/9/2018  Pt presents w/ acute encephalopathy and signficant PMH impacting her occupational performance including HTN, Alzheimers, [place]  Pt unable to provide social history upon evaluation  Details obtained from pt's chart  Pt lives w/ her daughter and has 24/7 assist / supervision at baseline between aide and daughter  Pt needs assistance w/ ADL and uses RW for functional mobility  Pt demonstrated B UE AROM WFL to complete ADL although limited at shoulder past 90*  Pt required mod A to complete bed mobility  Pt required max A x1 initially for sit to stand and transfer to commode, but progressed to mod A x1, stand by A of 2nd and significant physical prompts / cues  Pt presents w/ decreased activity tolerance, generalized weakness / deconditioning, decreased standing balance, decreased memory / recall, decreased attention impacting her I and safety w/ dressing, bathing, oral hygiene, functional transfers, functional mobiltiy, clothing mgmt, and activity engagement   Pt would benefit from OT while in acute care to address deficits  From an OT perspective, pt would benefit from post acute rehab when medically stable for discharge from acute care  Will continue to follow   Plan   Treatment Interventions ADL retraining;Functional transfer training;UE strengthening/ROM; Cognitive reorientation;Patient/family training;Equipment evaluation/education; Compensatory technique education;Continued evaluation; Energy conservation   Goal Expiration Date 04/17/18   OT Frequency 2-3x/wk   Recommendation   OT Discharge Recommendation (to post acute rehab when medically stable)   Equipment Recommended Bedside commode;Tub seat with back   OT - OK to Discharge (to post acute rehab when stable)   Barthel Index   Feeding 5   Bathing 0   Grooming Score 0   Dressing Score 0   Bladder Score 5   Bowels Score 5   Toilet Use Score 5   Transfers (Bed/Chair) Score 5   Mobility (Level Surface) Score 0   Stairs Score 0   Barthel Index Score 25   Modified [place] Scale   Modified [place] Scale 4   Pt goals to be met in 7 days:  1  Pt will complete functional transfers to bed, chair, and commode using least restrictive device w/ S  2  Pt will complete UBD w/ min A x1 after set- up  3  Pt will complete LBD w/ min A x1 after set- up  4  Pt will demonstrate increased activity and standing tolerance to complete oral hygiene standing at sink using least restrictive device w/ min A x1  5  Pt will consistently follow one step directions during ADL performance  6  Pt will demonstrate good attention and participation in continued evaluation to assess for DME and AE to max I w/ ADL performance upon discharge from acute care   7   Pt will complete bed mobility w/ S supine <> sit  Geoffrey Rom, OTR/L

## 2018-04-10 NOTE — PLAN OF CARE

## 2018-04-10 NOTE — PHYSICAL THERAPY NOTE
PHYSICAL THERAPY EVALUATION  NAME: Tracy Jaffe  AGE:   80 y o  MRN:  [id number]  ADMIT DX: Weakness [R53 1]  Encephalopathy [G93 40]    PMH:   Past Medical History:   Diagnosis Date    Alzheimer disease     Hypertension     Macular degeneration     Spinal stenosis      LENGTH OF STAY: 1     04/10/18 1100   Pain Assessment   Pain Assessment No/denies pain   Pain Score No Pain   Home Living   Type of [facility] [address]   Additional Comments Per chart, pt lives at home with her daughter and uses a RW to ambulate at baseline  Unknown home setup/level of assist required due to pt being poor historian  Prior Function   Level of [place] Needs assistance with ADLs and functional mobility   Lives With Daughter   Receives Help From (per chart, home health aides)   ADL Assistance Needs assistance   IADLs Needs assistance   Comments Per chart, daughter reports that she does not believe she can take care of her at home anymore  Restrictions/Precautions   Weight Bearing Precautions Per Order No   Other Precautions Impulsive;Cognitive; Chair Alarm; Bed Alarm; Fall Risk;Hard of hearing   General   Family/Caregiver Present No   Cognition   Overall Cognitive Status Impaired   Arousal/Participation Alert   Orientation Level Oriented to person;Disoriented to place; Disoriented to time;Disoriented to situation   Memory Decreased long term memory;Decreased short term memory;Decreased recall of recent events;Decreased recall of precautions;Decreased recall of biographical information   Following Commands Follows one step commands inconsistently   RLE Assessment   RLE Assessment X   Strength RLE   RLE Overall Strength 4-/5  (functionally)   LLE Assessment   LLE Assessment X   Strength LLE   LLE Overall Strength 4-/5  (functionally)   Bed Mobility   Supine to Sit 3  Moderate assistance   Additional items Assist x 1;HOB elevated; Bedrails; Increased time required;Verbal cues;LE management; Impulsive   Sit to Supine Unable to assess  (left OOB in chair post evaluation with alarm intact)   Transfers   Sit to Stand 2  Maximal assistance  (progressed to mod A x1 by 3rd stand with RW in front)   Additional items Assist x 2   Stand to Sit 3  Moderate assistance   Additional items Assist x 1; Increased time required;Verbal cues; Impulsive   Stand pivot 2  Maximal assistance  (progressed to mod A x1)   Additional items Assist x 2; Increased time required; Impulsive;Verbal cues  (x3 trials)   Ambulation/Elevation   Gait pattern Improper Weight shift; Forward Flexion; Retropulsion;Decreased foot clearance;Shuffling; Short stride; Step to;Excessively slow   Gait Assistance 2  Maximal assist  (progressed to mod A x1)   Additional items Assist x 2   Assistive Device Rolling walker;None  (first 2 trials without AD, 3rd trial with RW)   Distance 3` x3   (bed to commode, commode to bed, and bed to chair)   Balance   Static Sitting Fair -   Dynamic Sitting Poor +   Static Standing Poor   Dynamic Standing Poor -   Ambulatory Poor -   Endurance Deficit   Endurance Deficit Yes   Endurance Deficit Description limited ambulation distance   Activity Tolerance   Activity Tolerance Patient limited by fatigue   Nurse Made Aware Per RN Poonam Hoyt pt appropriate to evaluate   Assessment   Prognosis Fair   Problem List Decreased strength;Decreased endurance; Impaired balance;Decreased mobility; Decreased cognition; Impaired judgement;Decreased safety awareness; Impaired hearing   Goals   Patient Goals to go to the bathroom   STG Expiration Date 04/19/18   Short Term Goal #1 Pt will be able to: (1) perform bed mobility with min A x1 (2) perform sit to stand with min A x1 (3) perform SPT with min A x1 (4) ambulate 50` x1 with RW and min A x1 (5) initiate HEP (6) increase standing balance by 1 grade   Treatment Day 0   Plan   Treatment/Interventions Functional transfer training;LE strengthening/ROM; Therapeutic exercise; Endurance training;Cognitive reorientation;Patient/family training;Equipment eval/education; Bed mobility;Gait training   PT Frequency 5x/wk   Recommendation   Recommendation Post acute IP rehab   Equipment Recommended Walker   Barthel Index   Feeding 5   Bathing 0   Grooming Score 0   Dressing Score 0   Bladder Score 5   Bowels Score 5   Toilet Use Score 5   Transfers (Bed/Chair) Score 5   Mobility (Level Surface) Score 0   Stairs Score 0   Barthel Index Score 25       Assessment: Pt is a 80 y o  female seen for PT evaluation s/p admit to [address] [place] [address] on 4/9/2018 w/ Acute encephalopathy  Order placed for PT  Comorbidities affecting pt's physical performance at time of assessment listed above  Personal factors affecting pt at time of IE include: advanced age, past experience, inability to perform IADLs, inability to perform ADLs, inability to ambulate household distances and limited insight into impairments  Prior to admission, pt was living with her daughter and using RW for ambulation  Unknown level of assist required, however per chart, pt also has caretakers  Pt is a poor historian, reporting she lives with her "parents"  Upon evaluation: Pt initially requires mod A x1 for bed mobility, max A x2 for sit to stand, and max A x2 for SPT  However, pt improving with number of trials and with RW to mod A x1 for sit to stand and SPT  (Please find full objective findings from PT assessment regarding body systems outlined above)  Impairments and limitations also listed above, especially due to  weakness, impaired balance, decreased endurance, gait deviations, decreased activity tolerance, decreased safety awareness, impaired judgement, fall risk and decreased cognition  The following objective measures performed on IE also reveal limitations: Barthel Index 25/100   Pt's clinical presentation is currently unstable/unpredictable seen in pt's presentation of altered mental status, decreased safety awareness, and decreased motor commands followed  Pt to benefit from continued skilled PT tx while in hospital and upon DC to address deficits as defined above and maximize level of functional mobility  From PT/mobility standpoint, recommendation at time of d/c would be inpatient rehab pending progress in order to maximize pt's functional independence and consistency w/ mobility in order to facilitate return to PLOF  Recommend progression of ambulation, initiation of HEP, and dynamic balance activities as appropriate        Lodema Counter, PT,DPT

## 2018-04-10 NOTE — SOCIAL WORK
Pt accepted at [facility]  POA request for a later evening transport as she is working and her sister is flying in from [place] and they would like to be present for the d c considering her dementia  auth form sent to [place] for s non emergent transport  Galen Jones #[id number]  slets unable to do transport  [place] [facility] for 7:30pm  Observation notice provided to MARIJA  Pt family, RN, melissa and MD all aware of d c details

## 2018-04-10 NOTE — SOCIAL WORK
Cm spoke w pts daughter and Ulysses Barajas [phone]  Pt resides w Lisseth Huang  Has aides all day thru waiver program  Daughter home otherwise  Has 24/7 supervision  Pt requires assist w adls  Ambulates w rw  Dementia diagnosis  Can be combative  Can wander at night  Daughter requesting LT placement  She has already been working w [facility] regarding placement  Daughter does not want pt to go to any SNFs in network with clif  Preference to go to [facility] pending as she will be LT   Cm spoke w admissions at [facility] and referral sent--awaiting determination for placement today

## 2018-04-10 NOTE — CASE MANAGEMENT
Initial Clinical Review    Admission: Date/Time/Statement: 4/9/18 @ 15:02    Orders Placed This Encounter   Procedures    Place in Observation     Standing Status:   Standing     Number of Occurrences:   1     Order Specific Question:   Admitting Physician     Answer:   Gisselle River     Order Specific Question:   Level of Care     Answer:   Med Surg [16]         ED: Date/Time/Mode of Arrival:   ED Arrival Information     Expected Arrival Acuity Means of Arrival Escorted By Service Admission Type    - 4/9/2018 08:01 Urgent Ambulance [facility] General Medicine Urgent    Arrival Complaint    -          Chief Complaint:   Chief Complaint   Patient presents with    Altered Mental Status     Pt has a history of [place] and daughter states that she was acting "bizarre" last night  She states that she was talking to people who weren't there and that she also was using different words for objects  Per daughter she states that she has had sleepless nights before but nothing "bizarre like last night"  She also stated that she doesn't think that the care taker changed her yesterday from when she was soiled  History of Illness: 80-year-old female presents to the emergency department with altered mental status  Patient was at home with her daughter who is her primary caretaker  States she also has home health aides around the clock  Daughter states that she has been increasingly confused over the past 1-2 days  States she is talking to people who are not in the room and seems to be having trouble sleeping  No fevers at home  No history of recurrent urinary tract infections       ED Vital Signs:   ED Triage Vitals   Temperature Pulse Respirations Blood Pressure SpO2   04/09/18 0810 04/09/18 0806 04/09/18 0806 04/09/18 0806 04/09/18 0806   97 7 °F (36 5 °C) 61 14 (!) 180/77 100 %      Temp Source Heart Rate Source Patient Position - Orthostatic VS BP Location FiO2 (%)   04/09/18 5674 04/09/18 0806 04/09/18 0806 04/09/18 0806 --   Oral Monitor Lying Right arm       Pain Score       04/09/18 0806       No Pain        Wt Readings from Last 1 Encounters:   04/09/18 82 4 kg (181 lb 10 5 oz)       Vital Signs (abnormal):   Date and Time Temp Pulse SpO2 Resp BP   04/09/18 1647 97 9 °F (36 6 °C) 71 96 % 18 152/78   04/09/18 1633 -- 72 96 % 18 146/76   04/09/18 1400 -- 73 98 % 14 138/96   04/09/18 1108 -- 64 98 % 14  180/100       Abnormal Labs/Diagnostic Test Results: WBC 3 71    CT of Head: No acute intracranial abnormality  Moderate chronic small vessel ischemic changes   Old bilateral basal ganglia lacunar infarcts  ED Treatment:   Medication Administration from 04/09/2018 0801 to 04/09/2018 1647     None        Physical Exam   Constitutional: She appears well-developed and well-nourished  No distress  Cardiovascular: Normal rate, regular rhythm and normal heart sounds  Exam reveals no gallop and no friction rub  No murmur heard  Pulmonary/Chest: Effort normal and breath sounds normal  No respiratory distress  She has no wheezes  She has no rales  She exhibits no tenderness  Past Medical/Surgical History: Active Ambulatory Problems     Diagnosis Date Noted    No Active Ambulatory Problems     Resolved Ambulatory Problems     Diagnosis Date Noted    No Resolved Ambulatory Problems     Past Medical History:   Diagnosis Date    Alzheimer disease     Hypertension     Macular degeneration     Spinal stenosis        Admitting Diagnosis: Weakness [R53 1]  Encephalopathy [G93 40]    Age/Sex: 80 y o  female    Assessment/Plan:     * Acute encephalopathy   Assessment & Plan     ·  Patient presented with worsening visual hallucinations, confusion and some agitation as noted per her daughter  She did not respond to low-dose oral Ativan at home  ·   Will add Risperdal 0 5 mg melt tab twice a day and observe    · Assess for acute reversible etiology such as hypertensive encephalopathy versus dehydration vs toxic metabolic-- Check U02, folate, TSH, A1c, NH3  ·  trial IV fluids  ·  initial head CT showed old cerebral vascular disease, consider repeat head CT if any concern for possible stroke-- thus far no clear evidence to suggest acute stroke          Alzheimer's dementia with behavioral disturbance   Assessment & Plan     ·  Consult physical and occupational therapy to assess for any skillable need  ·  suspect she may have had progression of her underlying dementia which could also have been contributing to her current mental status  ·   Also involved case management-- sounds as if patient needs long-term placement at this juncture  They will be involved to assist in placement          Accelerated hypertension   Assessment & Plan     ·  Recommend better blood pressure control-- continue lotrel and tenormin but hold HCTZ in case of possible mild dehydration             VTE Prophylaxis: Enoxaparin (Lovenox)  / sequential compression device   Code Status: DNR/DNI  POLST: There is no POLST form on file for this patient (pre-hospital)  Discussion with family: spoke with daughter in detail     Anticipated Length of Stay:  Patient will be admitted on an Observation basis with an anticipated length of stay of less than 2 midnights     Justification for Hospital Stay:      Admission Orders:  Observation/Tele  Continuous Cardiac Monitoring  Consult Case Management r/e d/c planning  Bilateral Sequential Compression Device  OT/PT Evaluations    Scheduled Meds:   Current Facility-Administered Medications:  amLODIPine-benazepril (LOTREL 5/20) combo dose  Oral Daily   aspirin 81 mg Oral Daily   atenolol 50 mg Oral Daily   cholecalciferol 2,000 Units Oral Daily   enoxaparin 40 mg Subcutaneous Daily   escitalopram 10 mg Oral Daily   folic acid 1 mg Oral Daily   isosorbide mononitrate 30 mg Oral Daily   pantoprazole 40 mg Oral Daily Before Breakfast   risperiDONE 0 5 mg Oral BID   rivastigmine 3 mg Oral BID With Meals

## 2018-04-11 NOTE — CASE MANAGEMENT
Notification of Discharge  This is a Notification of Discharge from our facility [address]  Please be advised that this patient has been discharge from our facility  Below you will find the admission and discharge date and time including the patients disposition  PRESENTATION DATE: 4/9/2018  8:02 AM  Observation ADMISSION DATE: 4/9/18 1452PM  DISCHARGE DATE: 4/10/2018  7:55 PM  DISPOSITION: Non [facility] SNF/TCU/SNU    [address] [facility] in the [facility] by Wily Shea for 2017  Network Utilization Review Department  Phone: [phone]; Fax [phone]  ATTENTION: The Network Utilization Review Department is now centralized for our 7 Facilities  Make a note that we have a new phone and fax numbers for our Department  Please call with any questions or concerns to [phone] and carefully follow the prompts so that you are directed to the right person  All voicemails are confidential  Fax any determinations, approvals, denials, and requests for initial or continue stay review clinical to [phone]  Due to HIGH CALL volume, it would be easier if you could please send faxed requests to expedite your requests and in part, help us provide discharge notifications faster        Reference #[id number]

## 2019-06-12 ENCOUNTER — PATIENT OUTREACH (OUTPATIENT)
Dept: CASE MANAGEMENT | Facility: OTHER | Age: 84
End: 2019-06-12

## 2022-11-04 NOTE — ASSESSMENT & PLAN NOTE
· Recommend better blood pressure control-- continue lotrel and tenormin but hold HCTZ in case of possible mild dehydration--seems to be stable at this point [Time Spent: ___ minutes] : I have spent [unfilled] minutes of time on the encounter.